# Patient Record
Sex: FEMALE | Race: WHITE | NOT HISPANIC OR LATINO | Employment: OTHER | URBAN - METROPOLITAN AREA
[De-identification: names, ages, dates, MRNs, and addresses within clinical notes are randomized per-mention and may not be internally consistent; named-entity substitution may affect disease eponyms.]

---

## 2017-06-30 ENCOUNTER — HISTORICAL (OUTPATIENT)
Dept: LAB | Facility: HOSPITAL | Age: 57
End: 2017-06-30

## 2017-06-30 LAB
ALBUMIN SERPL-MCNC: 4.6 GM/DL (ref 3.4–5)
ALBUMIN/GLOB SERPL: 1.4 RATIO (ref 1.1–2)
ALP SERPL-CCNC: 55 UNIT/L (ref 46–116)
ALT SERPL-CCNC: 39 UNIT/L (ref 12–78)
AST SERPL-CCNC: 23 UNIT/L (ref 15–37)
BILIRUB SERPL-MCNC: 0.2 MG/DL (ref 0.2–1)
BILIRUBIN DIRECT+TOT PNL SERPL-MCNC: 0.07 MG/DL (ref 0–0.2)
BILIRUBIN DIRECT+TOT PNL SERPL-MCNC: 0.13 MG/DL (ref 0–0.8)
BUN SERPL-MCNC: 9 MG/DL (ref 7–18)
CALCIUM SERPL-MCNC: 9.2 MG/DL (ref 8.5–10.1)
CHLORIDE SERPL-SCNC: 105 MMOL/L (ref 98–107)
CHOLEST SERPL-MCNC: 172 MG/DL (ref 0–200)
CHOLEST/HDLC SERPL: 3.6 {RATIO} (ref 0–4)
CO2 SERPL-SCNC: 26.9 MMOL/L (ref 21–32)
CREAT SERPL-MCNC: 0.71 MG/DL (ref 0.6–1.3)
DEPRECATED CALCIDIOL+CALCIFEROL SERPL-MC: 20.49 NG/ML (ref 30–80)
ERYTHROCYTE [DISTWIDTH] IN BLOOD BY AUTOMATED COUNT: 13 % (ref 11.5–17)
EST. AVERAGE GLUCOSE BLD GHB EST-MCNC: 111 MG/DL
FT4I SERPL CALC-MCNC: 2.94
GLOBULIN SER-MCNC: 3.3 GM/DL (ref 2.4–3.5)
GLUCOSE SERPL-MCNC: 101 MG/DL (ref 74–106)
H PYLORI AB SER IA-ACNC: NEGATIVE
HBA1C MFR BLD: 5.5 % (ref 4.5–6.2)
HCT VFR BLD AUTO: 45.3 % (ref 37–47)
HDLC SERPL-MCNC: 48 MG/DL (ref 40–60)
HGB BLD-MCNC: 14.8 GM/DL (ref 12–16)
LDLC SERPL CALC-MCNC: 111 MG/DL (ref 0–129)
MCH RBC QN AUTO: 28.3 PG (ref 27–31)
MCHC RBC AUTO-ENTMCNC: 32.7 GM/DL (ref 33–36)
MCV RBC AUTO: 86.7 FL (ref 80–94)
PLATELET # BLD AUTO: 234 X10(3)/MCL (ref 130–400)
PMV BLD AUTO: 9 FL (ref 7.4–10.4)
POTASSIUM SERPL-SCNC: 4.4 MMOL/L (ref 3.5–5.1)
PROT SERPL-MCNC: 7.9 GM/DL (ref 6.4–8.2)
RBC # BLD AUTO: 5.22 X10(6)/MCL (ref 4.2–5.4)
SODIUM SERPL-SCNC: 144 MMOL/L (ref 136–145)
T3RU NFR SERPL: 33 % (ref 31–39)
T4 SERPL-MCNC: 8.9 MCG/DL (ref 4.7–13.3)
TESTOST SERPL-MCNC: 48.1 NG/DL (ref 14–76)
TRIGL SERPL-MCNC: 65 MG/DL
TSH SERPL-ACNC: 1.7 MIU/ML (ref 0.36–3.74)
VLDLC SERPL CALC-MCNC: 13 MG/DL
WBC # SPEC AUTO: 11.8 X10(3)/MCL (ref 4.5–11.5)

## 2017-07-21 ENCOUNTER — HISTORICAL (OUTPATIENT)
Dept: LAB | Facility: HOSPITAL | Age: 57
End: 2017-07-21

## 2017-10-10 ENCOUNTER — HISTORICAL (OUTPATIENT)
Dept: RADIOLOGY | Facility: HOSPITAL | Age: 57
End: 2017-10-10

## 2018-12-18 ENCOUNTER — HISTORICAL (OUTPATIENT)
Dept: INFUSION THERAPY | Facility: HOSPITAL | Age: 58
End: 2018-12-18

## 2019-07-22 ENCOUNTER — HISTORICAL (OUTPATIENT)
Dept: INFUSION THERAPY | Facility: HOSPITAL | Age: 59
End: 2019-07-22

## 2019-11-11 ENCOUNTER — HISTORICAL (OUTPATIENT)
Dept: ADMINISTRATIVE | Facility: HOSPITAL | Age: 59
End: 2019-11-11

## 2020-03-06 ENCOUNTER — HISTORICAL (OUTPATIENT)
Dept: INFUSION THERAPY | Facility: HOSPITAL | Age: 60
End: 2020-03-06

## 2020-08-25 LAB
HUMAN PAPILLOMAVIRUS (HPV): NORMAL
PAP RECOMMENDATION EXT: NORMAL

## 2020-09-11 ENCOUNTER — HISTORICAL (OUTPATIENT)
Dept: INFUSION THERAPY | Facility: HOSPITAL | Age: 60
End: 2020-09-11

## 2020-12-02 ENCOUNTER — HISTORICAL (OUTPATIENT)
Dept: ADMINISTRATIVE | Facility: HOSPITAL | Age: 60
End: 2020-12-02

## 2020-12-17 ENCOUNTER — HISTORICAL (OUTPATIENT)
Dept: RADIOLOGY | Facility: HOSPITAL | Age: 60
End: 2020-12-17

## 2022-08-11 ENCOUNTER — OFFICE VISIT (OUTPATIENT)
Dept: URGENT CARE | Facility: CLINIC | Age: 62
End: 2022-08-11
Payer: COMMERCIAL

## 2022-08-11 VITALS
OXYGEN SATURATION: 98 % | TEMPERATURE: 99 F | WEIGHT: 137 LBS | HEART RATE: 57 BPM | SYSTOLIC BLOOD PRESSURE: 158 MMHG | RESPIRATION RATE: 18 BRPM | DIASTOLIC BLOOD PRESSURE: 83 MMHG | BODY MASS INDEX: 22.02 KG/M2 | HEIGHT: 66 IN

## 2022-08-11 DIAGNOSIS — J01.40 ACUTE PANSINUSITIS, RECURRENCE NOT SPECIFIED: Primary | ICD-10-CM

## 2022-08-11 PROCEDURE — 3008F PR BODY MASS INDEX (BMI) DOCUMENTED: ICD-10-PCS | Mod: CPTII,,, | Performed by: FAMILY MEDICINE

## 2022-08-11 PROCEDURE — 3008F BODY MASS INDEX DOCD: CPT | Mod: CPTII,,, | Performed by: FAMILY MEDICINE

## 2022-08-11 PROCEDURE — 1160F RVW MEDS BY RX/DR IN RCRD: CPT | Mod: CPTII,,, | Performed by: FAMILY MEDICINE

## 2022-08-11 PROCEDURE — 3079F PR MOST RECENT DIASTOLIC BLOOD PRESSURE 80-89 MM HG: ICD-10-PCS | Mod: CPTII,,, | Performed by: FAMILY MEDICINE

## 2022-08-11 PROCEDURE — 1160F PR REVIEW ALL MEDS BY PRESCRIBER/CLIN PHARMACIST DOCUMENTED: ICD-10-PCS | Mod: CPTII,,, | Performed by: FAMILY MEDICINE

## 2022-08-11 PROCEDURE — 3077F SYST BP >= 140 MM HG: CPT | Mod: CPTII,,, | Performed by: FAMILY MEDICINE

## 2022-08-11 PROCEDURE — 1159F MED LIST DOCD IN RCRD: CPT | Mod: CPTII,,, | Performed by: FAMILY MEDICINE

## 2022-08-11 PROCEDURE — 99203 OFFICE O/P NEW LOW 30 MIN: CPT | Mod: ,,, | Performed by: FAMILY MEDICINE

## 2022-08-11 PROCEDURE — 3077F PR MOST RECENT SYSTOLIC BLOOD PRESSURE >= 140 MM HG: ICD-10-PCS | Mod: CPTII,,, | Performed by: FAMILY MEDICINE

## 2022-08-11 PROCEDURE — 1159F PR MEDICATION LIST DOCUMENTED IN MEDICAL RECORD: ICD-10-PCS | Mod: CPTII,,, | Performed by: FAMILY MEDICINE

## 2022-08-11 PROCEDURE — 99203 PR OFFICE/OUTPT VISIT, NEW, LEVL III, 30-44 MIN: ICD-10-PCS | Mod: ,,, | Performed by: FAMILY MEDICINE

## 2022-08-11 PROCEDURE — 3079F DIAST BP 80-89 MM HG: CPT | Mod: CPTII,,, | Performed by: FAMILY MEDICINE

## 2022-08-11 RX ORDER — PREDNISONE 20 MG/1
20 TABLET ORAL DAILY
Qty: 5 TABLET | Refills: 0 | Status: SHIPPED | OUTPATIENT
Start: 2022-08-11 | End: 2022-08-16

## 2022-08-11 RX ORDER — CYCLOBENZAPRINE HCL 10 MG
10 TABLET ORAL NIGHTLY PRN
COMMUNITY
Start: 2022-07-25 | End: 2022-08-18

## 2022-08-11 RX ORDER — ALPRAZOLAM 0.25 MG/1
.125-.25 TABLET ORAL 2 TIMES DAILY
COMMUNITY
Start: 2022-03-04 | End: 2022-08-18 | Stop reason: ALTCHOICE

## 2022-08-11 RX ORDER — VENLAFAXINE HYDROCHLORIDE 75 MG/1
75 CAPSULE, EXTENDED RELEASE ORAL EVERY MORNING
COMMUNITY
Start: 2022-03-28 | End: 2022-08-18

## 2022-08-11 RX ORDER — CEFDINIR 300 MG/1
300 CAPSULE ORAL 2 TIMES DAILY
Qty: 14 CAPSULE | Refills: 0 | Status: SHIPPED | OUTPATIENT
Start: 2022-08-11 | End: 2022-08-18

## 2022-08-11 RX ORDER — RISEDRONATE SODIUM 35 MG/1
1 TABLET, DELAYED RELEASE ORAL
COMMUNITY
Start: 2022-06-30 | End: 2022-08-18 | Stop reason: SDUPTHER

## 2022-08-11 NOTE — PROGRESS NOTES
"Subjective:       Patient ID: Kat Frazier is a 61 y.o. female.    Vitals:  height is 5' 6" (1.676 m) and weight is 62.1 kg (137 lb). Her oral temperature is 98.9 °F (37.2 °C). Her blood pressure is 158/83 (abnormal) and her pulse is 57 (abnormal). Her respiration is 18 and oxygen saturation is 98%.     Chief Complaint: Nasal Congestion    Patient presents to clinic with nasal congestion x 2-3 days    [place]:  61-year-old present to clinic with concerns of worsening nasal congestion, sinus congestion, postnasal drip and coughing since 6 days.  Gradual in onset and worsening noticing change in mucus color and thickness.  No shortness of breath or wheezing.  No concerns of positive exposure to infections.  Defers COVID testing today.  States home COVID-19 test has been negative    ROS :  Constitutional_No  Fever or Body aches, Chills  HENT_Sore throat, Difficulty swallowing, postnasal drainage  Respiratory_no wheezing, no shortness of breath  Cardiovascular_no chest pain  Gastrointestinal_ No nausea,No vomiting, No diarrhea, No abdominal pain  Musculoskeletal_no joint pain, no joint swelling  Integumentary_no skin rash     Objective:      Physical Exam    General : Alert and Oriented, No apparent distress, afebrile, sounds stuffy congested and nasal twang to voice  Neck - supple  HENT : Oropharynx no redness or swelling. Tonsils 2+ bilateral, no exudate. TMs intact mild fluid no redness.   Respiratory : Bilateral equal breath sounds, nonlabored respirations  Cardiovascular : Rate, rhythm regular, normal volume pulse, no murmur  Integumentary : Warm, Dry and no rash    Assessment:       1. Acute pansinusitis, recurrence not specified          Plan:     discussed the physical findings, clinical diagnosis, condition and course.  Cool mist vaporizer to keep there was moist and help draining sinuses.  Medications as directed.  Antihistamine of choice over-the-counter for congestion.  Continue Mucinex for cough and " cold.  Alternate Tylenol ibuprofen for pain and discomfort.  Call or return to clinic for any questions    Acute pansinusitis, recurrence not specified  -     cefdinir (OMNICEF) 300 MG capsule; Take 1 capsule (300 mg total) by mouth 2 (two) times daily. for 7 days  Dispense: 14 capsule; Refill: 0  -     predniSONE (DELTASONE) 20 MG tablet; Take 1 tablet (20 mg total) by mouth once daily. for 5 days  Dispense: 5 tablet; Refill: 0

## 2022-08-11 NOTE — PATIENT INSTRUCTIONS
discussed the physical findings, clinical diagnosis, condition and course.  Cool mist vaporizer to keep there was moist and help draining sinuses.  Medications as directed.  Antihistamine of choice over-the-counter for congestion.  Continue Mucinex for cough and cold.  Alternate Tylenol ibuprofen for pain and discomfort.  Call or return to clinic for any questions

## 2022-08-18 ENCOUNTER — OFFICE VISIT (OUTPATIENT)
Dept: FAMILY MEDICINE | Facility: CLINIC | Age: 62
End: 2022-08-18
Payer: COMMERCIAL

## 2022-08-18 VITALS
BODY MASS INDEX: 20.89 KG/M2 | DIASTOLIC BLOOD PRESSURE: 85 MMHG | SYSTOLIC BLOOD PRESSURE: 129 MMHG | RESPIRATION RATE: 20 BRPM | WEIGHT: 130 LBS | HEIGHT: 66 IN | OXYGEN SATURATION: 99 % | HEART RATE: 79 BPM | TEMPERATURE: 99 F

## 2022-08-18 DIAGNOSIS — K58.0 IRRITABLE BOWEL SYNDROME WITH DIARRHEA: ICD-10-CM

## 2022-08-18 DIAGNOSIS — M54.50 CHRONIC BILATERAL LOW BACK PAIN WITHOUT SCIATICA: Chronic | ICD-10-CM

## 2022-08-18 DIAGNOSIS — Z76.89 ENCOUNTER TO ESTABLISH CARE: Primary | ICD-10-CM

## 2022-08-18 DIAGNOSIS — I10 PRIMARY HYPERTENSION: ICD-10-CM

## 2022-08-18 DIAGNOSIS — F33.1 MDD (MAJOR DEPRESSIVE DISORDER), RECURRENT EPISODE, MODERATE: ICD-10-CM

## 2022-08-18 DIAGNOSIS — F41.1 GENERALIZED ANXIETY DISORDER: Chronic | ICD-10-CM

## 2022-08-18 DIAGNOSIS — G89.29 CHRONIC BILATERAL LOW BACK PAIN WITHOUT SCIATICA: Chronic | ICD-10-CM

## 2022-08-18 DIAGNOSIS — M85.89 OSTEOPENIA OF MULTIPLE SITES: ICD-10-CM

## 2022-08-18 DIAGNOSIS — K21.9 GASTROESOPHAGEAL REFLUX DISEASE WITHOUT ESOPHAGITIS: ICD-10-CM

## 2022-08-18 PROBLEM — F90.9 ATTENTION DEFICIT HYPERACTIVITY DISORDER (ADHD): Status: ACTIVE | Noted: 2022-08-18

## 2022-08-18 PROCEDURE — 1159F MED LIST DOCD IN RCRD: CPT | Mod: CPTII,,, | Performed by: FAMILY MEDICINE

## 2022-08-18 PROCEDURE — 1160F RVW MEDS BY RX/DR IN RCRD: CPT | Mod: CPTII,,, | Performed by: FAMILY MEDICINE

## 2022-08-18 PROCEDURE — 1159F PR MEDICATION LIST DOCUMENTED IN MEDICAL RECORD: ICD-10-PCS | Mod: CPTII,,, | Performed by: FAMILY MEDICINE

## 2022-08-18 PROCEDURE — 99204 OFFICE O/P NEW MOD 45 MIN: CPT | Mod: ,,, | Performed by: FAMILY MEDICINE

## 2022-08-18 PROCEDURE — 3008F PR BODY MASS INDEX (BMI) DOCUMENTED: ICD-10-PCS | Mod: CPTII,,, | Performed by: FAMILY MEDICINE

## 2022-08-18 PROCEDURE — 1160F PR REVIEW ALL MEDS BY PRESCRIBER/CLIN PHARMACIST DOCUMENTED: ICD-10-PCS | Mod: CPTII,,, | Performed by: FAMILY MEDICINE

## 2022-08-18 PROCEDURE — 3008F BODY MASS INDEX DOCD: CPT | Mod: CPTII,,, | Performed by: FAMILY MEDICINE

## 2022-08-18 PROCEDURE — 99204 PR OFFICE/OUTPT VISIT, NEW, LEVL IV, 45-59 MIN: ICD-10-PCS | Mod: ,,, | Performed by: FAMILY MEDICINE

## 2022-08-18 PROCEDURE — 3079F DIAST BP 80-89 MM HG: CPT | Mod: CPTII,,, | Performed by: FAMILY MEDICINE

## 2022-08-18 PROCEDURE — 3079F PR MOST RECENT DIASTOLIC BLOOD PRESSURE 80-89 MM HG: ICD-10-PCS | Mod: CPTII,,, | Performed by: FAMILY MEDICINE

## 2022-08-18 PROCEDURE — 3074F SYST BP LT 130 MM HG: CPT | Mod: CPTII,,, | Performed by: FAMILY MEDICINE

## 2022-08-18 PROCEDURE — 3074F PR MOST RECENT SYSTOLIC BLOOD PRESSURE < 130 MM HG: ICD-10-PCS | Mod: CPTII,,, | Performed by: FAMILY MEDICINE

## 2022-08-18 RX ORDER — METOPROLOL SUCCINATE 50 MG/1
50 TABLET, EXTENDED RELEASE ORAL DAILY
COMMUNITY
Start: 2022-06-26 | End: 2022-10-04 | Stop reason: SDUPTHER

## 2022-08-18 RX ORDER — DESVENLAFAXINE SUCCINATE 50 MG/1
50 TABLET, EXTENDED RELEASE ORAL DAILY
COMMUNITY
End: 2022-08-18

## 2022-08-18 RX ORDER — PANTOPRAZOLE SODIUM 40 MG/1
40 TABLET, DELAYED RELEASE ORAL DAILY
Qty: 30 TABLET | Refills: 2 | Status: SHIPPED | OUTPATIENT
Start: 2022-08-18 | End: 2023-06-15

## 2022-08-18 RX ORDER — NORETHINDRONE ACETATE AND ETHINYL ESTRADIOL 1; 5 MG/1; UG/1
1 TABLET ORAL DAILY
COMMUNITY

## 2022-08-18 RX ORDER — RISEDRONATE SODIUM 35 MG/1
1 TABLET, DELAYED RELEASE ORAL
Qty: 12 TABLET | Refills: 1 | Status: SHIPPED | OUTPATIENT
Start: 2022-08-18 | End: 2022-08-25 | Stop reason: ALTCHOICE

## 2022-08-18 RX ORDER — PAROXETINE 10 MG/1
10 TABLET, FILM COATED ORAL EVERY MORNING
Qty: 30 TABLET | Refills: 2 | Status: SHIPPED | OUTPATIENT
Start: 2022-08-18 | End: 2022-12-14 | Stop reason: SDUPTHER

## 2022-08-18 RX ORDER — DIAZEPAM 5 MG/1
5 TABLET ORAL EVERY 6 HOURS PRN
Qty: 60 TABLET | Refills: 2 | Status: SHIPPED | OUTPATIENT
Start: 2022-08-18 | End: 2022-12-14 | Stop reason: SDUPTHER

## 2022-08-18 NOTE — PROGRESS NOTES
"Subjective:      Patient ID: Kat Frazier is a 61 y.o. female.    Chief Complaint: Establish Care (Pt would like to discuss some anxiety and BP concerns. )    Patient here to establish care and with acute concerns about...    Depression due to recent separation and divorce - Has taken effexor and Pristiq in the past with ill effects, and also Wellbutrin, Valium and Xanax occasionally in the past as well.    Describes abd pains and IBS-diarrhea symptoms, depression, "breakdowns," anorexia.        Problem List Items Addressed This Visit     Generalized anxiety disorder (Chronic)    Chronic bilateral low back pain without sciatica (Chronic)      Other Visit Diagnoses     Encounter to establish care    -  Primary    MDD (major depressive disorder), recurrent episode, moderate        Primary hypertension        Osteopenia of multiple sites        Relevant Medications    risedronate 35 mg TbEC          The patient's Health Maintenance was reviewed and the following appears to be due:   Health Maintenance Due   Topic Date Due    Hepatitis C Screening  Never done    Cervical Cancer Screening  Never done    HIV Screening  Never done    TETANUS VACCINE  Never done    Colorectal Cancer Screening  Never done    Shingles Vaccine (1 of 2) Never done    COVID-19 Vaccine (3 - Booster for Pfizer series) 09/10/2021    Mammogram  12/17/2021    Lipid Panel  06/30/2022       Past Medical History:  Past Medical History:   Diagnosis Date    ADHD (attention deficit hyperactivity disorder)     Anxiety     Hypertension     Osteopenia      Past Surgical History:   Procedure Laterality Date    AUGMENTATION OF BREAST       Review of patient's allergies indicates:   Allergen Reactions    Sulfa (sulfonamide antibiotics) Itching     Current Outpatient Medications on File Prior to Visit   Medication Sig Dispense Refill    metoprolol succinate (TOPROL-XL) 50 MG 24 hr tablet Take 50 mg by mouth once daily.      " "norethindrone-ethinyl estradiol (FEMHRT 1/5) 1-5 mg-mcg Tab Take 1 tablet by mouth once daily.      [DISCONTINUED] desvenlafaxine succinate (PRISTIQ) 50 MG Tb24 Take 50 mg by mouth once daily.      [DISCONTINUED] risedronate 35 mg TbEC Take 1 tablet by mouth every 7 days.      ALPRAZolam (XANAX) 0.25 MG tablet Take 0.125-0.25 mg by mouth 2 (two) times daily.      [DISCONTINUED] cefdinir (OMNICEF) 300 MG capsule Take 1 capsule (300 mg total) by mouth 2 (two) times daily. for 7 days (Patient not taking: Reported on 8/18/2022) 14 capsule 0    [DISCONTINUED] cyclobenzaprine (FLEXERIL) 10 MG tablet Take 10 mg by mouth nightly as needed.      [DISCONTINUED] venlafaxine (EFFEXOR-XR) 75 MG 24 hr capsule Take 75 mg by mouth every morning.       No current facility-administered medications on file prior to visit.     Social History     Socioeconomic History    Marital status:     Number of children: 5   Occupational History    Occupation: Unemployed    Tobacco Use    Smoking status: Never Smoker    Smokeless tobacco: Never Used   Substance and Sexual Activity    Alcohol use: Yes     Comment: Occasionally     Drug use: Never    Sexual activity: Not Currently     Family History   Problem Relation Age of Onset    Hypertension Mother     Heart disease Father     Stroke Father     Heart attack Father        Review of Systems   All other systems reviewed and are negative.      Objective:   /85 (BP Location: Right arm, Patient Position: Sitting, BP Method: Large (Automatic))   Pulse 79   Temp 99 °F (37.2 °C) (Temporal)   Resp 20   Ht 5' 6" (1.676 m)   Wt 59 kg (130 lb)   LMP  (LMP Unknown)   SpO2 99%   BMI 20.98 kg/m²     Physical Exam  Vitals and nursing note reviewed.   Constitutional:       Appearance: Normal appearance. She is normal weight.   HENT:      Head: Normocephalic and atraumatic.      Right Ear: Tympanic membrane, ear canal and external ear normal.      Left Ear: Tympanic " membrane, ear canal and external ear normal.      Nose: Nose normal.      Mouth/Throat:      Mouth: Mucous membranes are moist.      Pharynx: Oropharynx is clear.   Eyes:      Extraocular Movements: Extraocular movements intact.      Conjunctiva/sclera: Conjunctivae normal.      Pupils: Pupils are equal, round, and reactive to light.   Cardiovascular:      Rate and Rhythm: Normal rate and regular rhythm.      Pulses: Normal pulses.      Heart sounds: Normal heart sounds.   Pulmonary:      Effort: Pulmonary effort is normal.      Breath sounds: Normal breath sounds.   Abdominal:      General: Abdomen is flat. Bowel sounds are normal.      Palpations: Abdomen is soft.   Musculoskeletal:         General: Normal range of motion.      Cervical back: Normal range of motion and neck supple.   Skin:     General: Skin is warm and dry.      Capillary Refill: Capillary refill takes less than 2 seconds.   Neurological:      General: No focal deficit present.      Mental Status: She is alert and oriented to person, place, and time. Mental status is at baseline.   Psychiatric:         Attention and Perception: Attention and perception normal.         Mood and Affect: Mood is anxious and depressed. Affect is blunt and flat.         Speech: Speech normal.         Behavior: Behavior normal. Behavior is cooperative.         Thought Content: Thought content normal.         Judgment: Judgment normal.         No visits with results within 6 Month(s) from this visit.   Latest known visit with results is:   Historical on 06/30/2017   Component Date Value Ref Range Status    Albumin/Globulin Ratio 06/30/2017 1.4  1.1 - 2.0 ratio Final    Alanine Aminotransferase 06/30/2017 39  12 - 78 unit/L Final    Albumin Level 06/30/2017 4.60  3.40 - 5.00 gm/dL Final    Alkaline Phosphatase 06/30/2017 55  46 - 116 unit/L Final    Aspartate Aminotransferase 06/30/2017 23  15 - 37 unit/L Final    Blood Urea Nitrogen 06/30/2017 9.0  7.0 - 18.0 mg/dL  Final    Bilirubin Direct 06/30/2017 0.07  0.00 - 0.20 mg/dL Final    Bilirubin Total 06/30/2017 0.2  0.2 - 1.0 mg/dL Final    Bilirubin Indirect 06/30/2017 0.13  0.00 - 0.80 mg/dL Final    Chloride 06/30/2017 105  98 - 107 mmol/L Final    Carbon Dioxide 06/30/2017 26.9  21.0 - 32.0 mmol/L Final    Calcium Level Total 06/30/2017 9.2  8.5 - 10.1 mg/dL Final    Creatinine 06/30/2017 0.71  0.60 - 1.30 mg/dL Final    Glucose Level 06/30/2017 101  74 - 106 mg/dL Final    Globulin 06/30/2017 3.30  2.40 - 3.50 gm/dL Final    Sodium Level 06/30/2017 144  136 - 145 mmol/L Final    Potassium Level 06/30/2017 4.4  3.5 - 5.1 mmol/L Final    Protein Total 06/30/2017 7.9  6.4 - 8.2 gm/dL Final    Cholesterol Total 06/30/2017 172  0 - 200 mg/dL Final    Cholesterol/HDL Ratio 06/30/2017 3.6  0.0 - 4.0 Final    HDL Cholesterol 06/30/2017 48  40 - 60 mg/dL Final    LDL Cholesterol 06/30/2017 111  0 - 129 mg/dL Final    Triglyceride 06/30/2017 65  <<=200 mg/dL Final    Very Low Density Lipoprotein 06/30/2017 13  mg/dL Final    Estimated Average Glucose 06/30/2017 111  mg/dL Final    Hemoglobin A1c 06/30/2017 5.5  4.5 - 6.2 % Final    Free Thyroxine Index 06/30/2017 2.94   Final    T3 Uptake 06/30/2017 33.0  31.0 - 39.0 % Final    Thyroxine 06/30/2017 8.90  4.70 - 13.30 mcg/dL Final    Thyroid Stimulating Hormone 06/30/2017 1.700  0.360 - 3.740 mIU/mL Final    Testosterone Total 06/30/2017 48.1  14.0 - 76.0 ng/dL Final    Vit D 25 OH 06/30/2017 20.49 (A) 30.00 - 80.00 ng/mL Final    Estimated GFR- 06/30/2017 >60  mL/min/1.73 m2 Final    Estimated GFR-Non  06/30/2017 >60  mL/min/1.73 m2 Final    Hgb 06/30/2017 14.8  12.0 - 16.0 gm/dL Final    Hct 06/30/2017 45.3  37.0 - 47.0 % Final    MCV 06/30/2017 86.7  80.0 - 94.0 fL Final    MCH 06/30/2017 28.3  27.0 - 31.0 pg Final    MCHC 06/30/2017 32.7 (A) 33.0 - 36.0 gm/dL Final    MPV 06/30/2017 9.0  7.4 - 10.4 fL Final    WBC  06/30/2017 11.8 (A) 4.5 - 11.5 x10(3)/mcL Final    RBC 06/30/2017 5.22  4.20 - 5.40 x10(6)/mcL Final    RDW 06/30/2017 13.0  11.5 - 17.0 % Final    Platelet 06/30/2017 234  130 - 400 x10(3)/mcL Final       X-Ray Femur 2 View Right  Narrative: EXAMINATION:  XR FEMUR 2 VIEW RIGHT    CLINICAL HISTORY:  myositis; Other myositis, multiple sites    TECHNIQUE:  Standard views acquired    COMPARISON:  None.    FINDINGS:  Normal bone structure of the patient's age.    Negative for fracture or lytic lesion.    Normal for alignment and mineralization.    Negative for degenerative joint disease.    Normal periarticular soft tissues.  Impression: Normal study.    Electronically signed by: Joselo Joiner  Date:    06/22/2022  Time:    13:34  X-Ray Lumbar Spine 5 View  Narrative: EXAMINATION:  XR LUMBAR SPINE COMPLETE 5 VIEW    CLINICAL HISTORY:  myositis; Other myositis, multiple sites    TECHNIQUE:  AP, lateral, bilateral oblique and L5-S1 spot views.    COMPARISON:  Five views lumbar spine dated 12/02/2020.    FINDINGS:  No change.    L3-4: Positive for degenerative instability: Advanced DJD of the facet joints with anterolisthesis of 5-6 mm.    Positive for advanced degenerative disc disease at L4-5 and L5-S1: Near complete loss of all disc space volume, endplate sclerosis and anterior spurring.    Positive advanced DJD of the facets at L4-5 and L5-S1: Prominent sclerosis, spurring and joint space narrowing.    Normal remaining bone structures and disc spaces.    No evidence of fracture or lytic lesion.    Normal for alignment and mineralization.    Normal paraspinal soft tissues.  Impression: L3-4: Degenerative instability: DJD of the facets with subluxation.    L4-5 and L5-S1: Advanced DDD and DJD of the facets.    Electronically signed by: Joselo Joiner  Date:    06/22/2022  Time:    13:33  X-Ray Hip 2 or 3 views Right (with Pelvis when performed)  Narrative: EXAMINATION:  RIGHT HIP, 2 VIEWS:    CLINICAL  HISTORY:  Myositis; pain    TECHNIQUE:  Normal left hip two views    COMPARISON:  Two views of the right hip, 12/02/2020, noted to be normal.    FINDINGS:  Normal bone structure for the patient's age.    Negative for fracture, subluxation or lytic lesion.    Normal alignment and mineralization.    No significant osteoarthritic degenerative changes.    Normal left hemipelvis.    Normal periarticular soft tissues.  Impression: NORMAL STUDY.    Electronically signed by: Joselo Joiner  Date:    06/22/2022  Time:    11:13       Assessment:     1. Encounter to establish care    2. MDD (major depressive disorder), recurrent episode, moderate    3. Generalized anxiety disorder    4. Primary hypertension    5. Osteopenia of multiple sites    6. Chronic bilateral low back pain without sciatica      Plan:   I have changed Kat Frazier's risedronate. I am also having her maintain her ALPRAZolam, metoprolol succinate, and norethindrone-ethinyl estradiol.  Problem List Items Addressed This Visit     Generalized anxiety disorder (Chronic)    Chronic bilateral low back pain without sciatica (Chronic)      Other Visit Diagnoses     Encounter to establish care    -  Primary    MDD (major depressive disorder), recurrent episode, moderate        Primary hypertension        Osteopenia of multiple sites        Relevant Medications    risedronate 35 mg TbEC        Follow up for mammogram, DEXA, pap reports from Dr. Jung; colonoscopy (Dr. Frias).    Kat was seen today for establish care.    Diagnoses and all orders for this visit:    Encounter to establish care    MDD (major depressive disorder), recurrent episode, moderate  Generalized anxiety disorder   Primary reason for visit   Has previously failed SNRI x 2, and Wellbutrin   Start Paxil   Start valium (patient is familiar with this medication)   RTC 3 months to reassess   Advise seek out grief/divorce counseling.   Counseling on disease state, medications, and adjacent  effects of anxiety    Primary hypertension   Continue current prescription medications. Refills as needed   Condition/Symptoms controlled   RTC 3 months (as scheduled) or PRN    Osteopenia of multiple sites  -     risedronate 35 mg TbEC; Take 1 tablet (35 mg total) by mouth every 7 days.   Continue current prescription medications. Refills as needed   Condition/Symptoms controlled, request recent records of relevant studies from OBGYN re: monitoring/maintenace   RTC 3 months (as scheduled) or PRN    Chronic bilateral low back pain without sciatica   Seeing Ivette Lundy.       I spent a total of 45 minutes on the day of the visit.  This includes face to face time and non-face to face time preparing to see the patient (eg, review of tests), obtaining and/or reviewing separately obtained history, documenting clinical information in the electronic or other health record, independently interpreting results and communicating results to the patient/family/caregiver, or care coordinator.          Medications Ordered This Encounter   Medications    risedronate 35 mg TbEC     Sig: Take 1 tablet (35 mg total) by mouth every 7 days.     Dispense:  12 tablet     Refill:  1     [unfilled]  No orders of the defined types were placed in this encounter.      Medication List with Changes/Refills   Current Medications    ALPRAZOLAM (XANAX) 0.25 MG TABLET    Take 0.125-0.25 mg by mouth 2 (two) times daily.    METOPROLOL SUCCINATE (TOPROL-XL) 50 MG 24 HR TABLET    Take 50 mg by mouth once daily.    NORETHINDRONE-ETHINYL ESTRADIOL (FEMHRT 1/5) 1-5 MG-MCG TAB    Take 1 tablet by mouth once daily.   Changed and/or Refilled Medications    Modified Medication Previous Medication    RISEDRONATE 35 MG TBEC risedronate 35 mg TbEC       Take 1 tablet (35 mg total) by mouth every 7 days.    Take 1 tablet by mouth every 7 days.   Discontinued Medications    CEFDINIR (OMNICEF) 300 MG CAPSULE    Take 1 capsule (300 mg total) by mouth 2 (two) times  daily. for 7 days    CYCLOBENZAPRINE (FLEXERIL) 10 MG TABLET    Take 10 mg by mouth nightly as needed.    DESVENLAFAXINE SUCCINATE (PRISTIQ) 50 MG TB24    Take 50 mg by mouth once daily.    VENLAFAXINE (EFFEXOR-XR) 75 MG 24 HR CAPSULE    Take 75 mg by mouth every morning.      Medication List with Changes/Refills   Current Medications    ALPRAZOLAM (XANAX) 0.25 MG TABLET    Take 0.125-0.25 mg by mouth 2 (two) times daily.       Start Date: 3/4/2022  End Date: --    METOPROLOL SUCCINATE (TOPROL-XL) 50 MG 24 HR TABLET    Take 50 mg by mouth once daily.       Start Date: 6/26/2022 End Date: --    NORETHINDRONE-ETHINYL ESTRADIOL (FEMHRT 1/5) 1-5 MG-MCG TAB    Take 1 tablet by mouth once daily.       Start Date: --        End Date: --   Changed and/or Refilled Medications    Modified Medication Previous Medication    RISEDRONATE 35 MG TBEC risedronate 35 mg TbEC       Take 1 tablet (35 mg total) by mouth every 7 days.    Take 1 tablet by mouth every 7 days.       Start Date: 8/18/2022 End Date: 2/14/2023    Start Date: 6/30/2022 End Date: 8/18/2022   Discontinued Medications    CEFDINIR (OMNICEF) 300 MG CAPSULE    Take 1 capsule (300 mg total) by mouth 2 (two) times daily. for 7 days       Start Date: 8/11/2022 End Date: 8/18/2022    CYCLOBENZAPRINE (FLEXERIL) 10 MG TABLET    Take 10 mg by mouth nightly as needed.       Start Date: 7/25/2022 End Date: 8/18/2022    DESVENLAFAXINE SUCCINATE (PRISTIQ) 50 MG TB24    Take 50 mg by mouth once daily.       Start Date: --        End Date: 8/18/2022    VENLAFAXINE (EFFEXOR-XR) 75 MG 24 HR CAPSULE    Take 75 mg by mouth every morning.       Start Date: 3/28/2022 End Date: 8/18/2022

## 2022-08-24 ENCOUNTER — TELEPHONE (OUTPATIENT)
Dept: FAMILY MEDICINE | Facility: CLINIC | Age: 62
End: 2022-08-24
Payer: COMMERCIAL

## 2022-08-24 NOTE — TELEPHONE ENCOUNTER
----- Message from Hailee Durham sent at 8/24/2022 12:08 PM CDT -----  Regarding: Meds  .Type:  Needs Medical Advice    Who Called: Pt  Symptoms (please be specific):    How long has patient had these symptoms:    Pharmacy name and phone #:  [facility] on [address]  Would the patient rather a call back or a response via [facility]? Call back  Best Call Back Number: [phone]  Additional Information: Pt wants to know if Alendronate 70mg or 30mg could be called in, pt stated that meds that was originally called in for her bone are too expensive out of pocket, would like nurse to f/u

## 2022-08-25 RX ORDER — ALENDRONATE SODIUM 70 MG/1
70 TABLET ORAL
Qty: 4 TABLET | Refills: 11 | Status: SHIPPED | OUTPATIENT
Start: 2022-08-25 | End: 2022-11-28

## 2022-08-25 NOTE — TELEPHONE ENCOUNTER
I have signed for the following orders AND/OR meds.  Please call the patient and ask the patient to schedule the testing AND/OR inform about any medications that were sent.      No orders of the defined types were placed in this encounter.      Medications Ordered This Encounter   Medications    alendronate (FOSAMAX) 70 MG tablet     Sig: Take 1 tablet (70 mg total) by mouth every 7 days.     Dispense:  4 tablet     Refill:  11

## 2022-09-14 ENCOUNTER — HOSPITAL ENCOUNTER (OUTPATIENT)
Dept: RADIOLOGY | Facility: HOSPITAL | Age: 62
Discharge: HOME | End: 2022-09-14
Attending: STUDENT IN AN ORGANIZED HEALTH CARE EDUCATION/TRAINING PROGRAM
Payer: COMMERCIAL

## 2022-09-14 DIAGNOSIS — M85.80 OSTEOPENIA: ICD-10-CM

## 2022-09-14 DIAGNOSIS — Z12.31 ENCOUNTER FOR SCREENING MAMMOGRAM FOR BREAST CANCER: ICD-10-CM

## 2022-09-14 PROCEDURE — 77063 BREAST TOMOSYNTHESIS BI: CPT | Mod: 26,,, | Performed by: STUDENT IN AN ORGANIZED HEALTH CARE EDUCATION/TRAINING PROGRAM

## 2022-09-14 PROCEDURE — 77067 SCR MAMMO BI INCL CAD: CPT | Mod: TC

## 2022-09-14 PROCEDURE — 77080 DXA BONE DENSITY AXIAL: CPT | Mod: TC

## 2022-09-14 PROCEDURE — 77080 DEXA BONE DENSITY SPINE HIP: ICD-10-PCS | Mod: 26,,, | Performed by: RADIOLOGY

## 2022-09-14 PROCEDURE — 77067 MAMMO DIGITAL SCREENING BILAT WITH TOMO: ICD-10-PCS | Mod: 26,,, | Performed by: STUDENT IN AN ORGANIZED HEALTH CARE EDUCATION/TRAINING PROGRAM

## 2022-09-14 PROCEDURE — 77063 MAMMO DIGITAL SCREENING BILAT WITH TOMO: ICD-10-PCS | Mod: 26,,, | Performed by: STUDENT IN AN ORGANIZED HEALTH CARE EDUCATION/TRAINING PROGRAM

## 2022-09-14 PROCEDURE — 77067 SCR MAMMO BI INCL CAD: CPT | Mod: 26,,, | Performed by: STUDENT IN AN ORGANIZED HEALTH CARE EDUCATION/TRAINING PROGRAM

## 2022-09-14 PROCEDURE — 77080 DXA BONE DENSITY AXIAL: CPT | Mod: 26,,, | Performed by: RADIOLOGY

## 2022-09-21 ENCOUNTER — TELEPHONE (OUTPATIENT)
Dept: FAMILY MEDICINE | Facility: CLINIC | Age: 62
End: 2022-09-21
Payer: COMMERCIAL

## 2022-09-21 DIAGNOSIS — M81.0 OSTEOPOROSIS, UNSPECIFIED OSTEOPOROSIS TYPE, UNSPECIFIED PATHOLOGICAL FRACTURE PRESENCE: Primary | ICD-10-CM

## 2022-09-27 ENCOUNTER — PATIENT MESSAGE (OUTPATIENT)
Dept: FAMILY MEDICINE | Facility: CLINIC | Age: 62
End: 2022-09-27
Payer: COMMERCIAL

## 2022-09-29 NOTE — TELEPHONE ENCOUNTER
I spoke with the patient via phone call for 18:15 minutes regarding the confusion around her recent DEXA scans and the prescribed treatments. Her concerns were that she had previously been on Prolia, and that was stopped after her previous DEXA showed only osteopenia. But then this more recent one showed a regression to osteoporosis. I prescribed alendronate, with which she was unfamiliar, and Dr. Jung (the DEXA ordering physician who forwarded the results to me and left the prescribing decision to me) had suggested a return to Prolia.    We discussed the value and virtues of Alendronate vs prolia. She left the conversation satisfied with continuing alendronate for the time being, and will inform us of adverse effects or intolerance. Her DEXA will be repeated in 2 years as scheduled, and changes to medication discussed then based on those results.    Again, total phone time, with all time spent discussing disease, results, prescriptions, and plan = 18:15 minutes

## 2022-10-04 DIAGNOSIS — I10 HYPERTENSION, UNSPECIFIED TYPE: Primary | ICD-10-CM

## 2022-10-04 RX ORDER — METOPROLOL SUCCINATE 50 MG/1
50 TABLET, EXTENDED RELEASE ORAL DAILY
Qty: 30 TABLET | Refills: 6 | Status: SHIPPED | OUTPATIENT
Start: 2022-10-04 | End: 2022-11-28 | Stop reason: SDUPTHER

## 2022-10-21 DIAGNOSIS — M81.0 OSTEOPOROSIS, UNSPECIFIED OSTEOPOROSIS TYPE, UNSPECIFIED PATHOLOGICAL FRACTURE PRESENCE: ICD-10-CM

## 2022-10-27 ENCOUNTER — TELEPHONE (OUTPATIENT)
Dept: INFUSION THERAPY | Facility: HOSPITAL | Age: 62
End: 2022-10-27

## 2022-10-31 ENCOUNTER — INFUSION (OUTPATIENT)
Dept: INFUSION THERAPY | Facility: HOSPITAL | Age: 62
End: 2022-10-31
Attending: OBSTETRICS & GYNECOLOGY
Payer: COMMERCIAL

## 2022-10-31 VITALS
HEART RATE: 50 BPM | DIASTOLIC BLOOD PRESSURE: 79 MMHG | WEIGHT: 132 LBS | BODY MASS INDEX: 21.31 KG/M2 | TEMPERATURE: 98 F | SYSTOLIC BLOOD PRESSURE: 162 MMHG | RESPIRATION RATE: 16 BRPM

## 2022-10-31 DIAGNOSIS — M81.0 OSTEOPOROSIS, UNSPECIFIED OSTEOPOROSIS TYPE, UNSPECIFIED PATHOLOGICAL FRACTURE PRESENCE: Primary | ICD-10-CM

## 2022-10-31 PROCEDURE — 96372 THER/PROPH/DIAG INJ SC/IM: CPT

## 2022-10-31 PROCEDURE — 63600175 PHARM REV CODE 636 W HCPCS: Mod: JG | Performed by: OBSTETRICS & GYNECOLOGY

## 2022-10-31 RX ADMIN — DENOSUMAB 60 MG: 60 INJECTION SUBCUTANEOUS at 08:10

## 2022-10-31 NOTE — PLAN OF CARE
Prolia administered. Cielo well. Pt was discharged without complaints. She was advised to F/U with DR Jung prior to May appt for new orders.

## 2022-11-22 ENCOUNTER — OFFICE VISIT (OUTPATIENT)
Dept: URGENT CARE | Facility: CLINIC | Age: 62
End: 2022-11-22
Payer: COMMERCIAL

## 2022-11-22 VITALS
BODY MASS INDEX: 20.89 KG/M2 | HEIGHT: 66 IN | TEMPERATURE: 99 F | OXYGEN SATURATION: 100 % | RESPIRATION RATE: 20 BRPM | DIASTOLIC BLOOD PRESSURE: 89 MMHG | HEART RATE: 52 BPM | SYSTOLIC BLOOD PRESSURE: 156 MMHG | WEIGHT: 130 LBS

## 2022-11-22 DIAGNOSIS — R05.9 COUGH, UNSPECIFIED TYPE: ICD-10-CM

## 2022-11-22 DIAGNOSIS — J02.9 SORE THROAT: ICD-10-CM

## 2022-11-22 DIAGNOSIS — J11.1 INFLUENZA: Primary | ICD-10-CM

## 2022-11-22 LAB
CTP QC/QA: YES
MOLECULAR STREP A: NEGATIVE
POC MOLECULAR INFLUENZA A AGN: POSITIVE
POC MOLECULAR INFLUENZA B AGN: NEGATIVE
SARS-COV-2 RDRP RESP QL NAA+PROBE: NEGATIVE

## 2022-11-22 PROCEDURE — 87651 POCT STREP A MOLECULAR: ICD-10-PCS | Mod: QW,,, | Performed by: FAMILY MEDICINE

## 2022-11-22 PROCEDURE — 3079F DIAST BP 80-89 MM HG: CPT | Mod: CPTII,,, | Performed by: FAMILY MEDICINE

## 2022-11-22 PROCEDURE — 3077F SYST BP >= 140 MM HG: CPT | Mod: CPTII,,, | Performed by: FAMILY MEDICINE

## 2022-11-22 PROCEDURE — 87651 STREP A DNA AMP PROBE: CPT | Mod: QW,,, | Performed by: FAMILY MEDICINE

## 2022-11-22 PROCEDURE — 99213 OFFICE O/P EST LOW 20 MIN: CPT | Mod: ,,, | Performed by: FAMILY MEDICINE

## 2022-11-22 PROCEDURE — 87502 INFLUENZA DNA AMP PROBE: CPT | Mod: QW,,, | Performed by: FAMILY MEDICINE

## 2022-11-22 PROCEDURE — 87635 SARS-COV-2 COVID-19 AMP PRB: CPT | Mod: QW,,, | Performed by: FAMILY MEDICINE

## 2022-11-22 PROCEDURE — 87502 POCT INFLUENZA A/B MOLECULAR: ICD-10-PCS | Mod: QW,,, | Performed by: FAMILY MEDICINE

## 2022-11-22 PROCEDURE — 99213 PR OFFICE/OUTPT VISIT, EST, LEVL III, 20-29 MIN: ICD-10-PCS | Mod: ,,, | Performed by: FAMILY MEDICINE

## 2022-11-22 PROCEDURE — 1159F MED LIST DOCD IN RCRD: CPT | Mod: CPTII,,, | Performed by: FAMILY MEDICINE

## 2022-11-22 PROCEDURE — 87635: ICD-10-PCS | Mod: QW,,, | Performed by: FAMILY MEDICINE

## 2022-11-22 PROCEDURE — 3008F PR BODY MASS INDEX (BMI) DOCUMENTED: ICD-10-PCS | Mod: CPTII,,, | Performed by: FAMILY MEDICINE

## 2022-11-22 PROCEDURE — 3008F BODY MASS INDEX DOCD: CPT | Mod: CPTII,,, | Performed by: FAMILY MEDICINE

## 2022-11-22 PROCEDURE — 3077F PR MOST RECENT SYSTOLIC BLOOD PRESSURE >= 140 MM HG: ICD-10-PCS | Mod: CPTII,,, | Performed by: FAMILY MEDICINE

## 2022-11-22 PROCEDURE — 3079F PR MOST RECENT DIASTOLIC BLOOD PRESSURE 80-89 MM HG: ICD-10-PCS | Mod: CPTII,,, | Performed by: FAMILY MEDICINE

## 2022-11-22 PROCEDURE — 1159F PR MEDICATION LIST DOCUMENTED IN MEDICAL RECORD: ICD-10-PCS | Mod: CPTII,,, | Performed by: FAMILY MEDICINE

## 2022-11-22 RX ORDER — BALOXAVIR MARBOXIL 40 MG/1
40 TABLET, FILM COATED ORAL ONCE
Qty: 1 TABLET | Refills: 0 | Status: SHIPPED | OUTPATIENT
Start: 2022-11-22 | End: 2022-11-22

## 2022-11-22 RX ORDER — OSELTAMIVIR PHOSPHATE 75 MG/1
75 CAPSULE ORAL 2 TIMES DAILY
Qty: 10 CAPSULE | Refills: 0 | Status: SHIPPED | OUTPATIENT
Start: 2022-11-22 | End: 2022-11-27

## 2022-11-22 RX ORDER — TIZANIDINE 4 MG/1
TABLET ORAL
COMMUNITY
Start: 2022-10-10 | End: 2024-02-01

## 2022-11-22 RX ORDER — MELOXICAM 15 MG/1
15 TABLET ORAL DAILY
COMMUNITY
Start: 2022-10-10 | End: 2024-01-03

## 2022-11-22 NOTE — PROGRESS NOTES
"Subjective:       Patient ID: Kat Frazier is a 62 y.o. female.    Vitals:  height is 5' 6" (1.676 m) and weight is 59 kg (130 lb). Her oral temperature is 99.4 °F (37.4 °C). Her blood pressure is 156/89 (abnormal) and her pulse is 52 (abnormal). Her respiration is 20 and oxygen saturation is 100%.     Chief Complaint: Cough (Cough, sinus and chest congestion, sore throat and glands, headache, since yesterday)    62-year-old female presents to clinic complaining of a 2 day history of Cough, sinus and chest congestion, sore throat and glands,.  Denies any vomiting diarrhea or shortness of breath.  Does feel a little nauseated after coughing fits.  Denies any fever.    Cough    Constitution: Negative.   HENT: Negative.     Cardiovascular: Negative.    Eyes: Negative.    Respiratory:  Positive for cough.    Gastrointestinal: Negative.    Genitourinary: Negative.    Musculoskeletal: Negative.    Skin: Negative.    Allergic/Immunologic: Negative.    Neurological: Negative.    Hematologic/Lymphatic: Negative.      Objective:      Physical Exam   Constitutional: She is oriented to person, place, and time. She appears well-developed. She is cooperative.  Non-toxic appearance. She does not appear ill. No distress.   HENT:   Head: Normocephalic and atraumatic.   Ears:   Right Ear: Hearing and external ear normal.   Left Ear: Hearing and external ear normal.   Mouth/Throat: Oropharynx is clear and moist and mucous membranes are normal. Posterior oropharyngeal erythema: postnasal drip.   Eyes: Conjunctivae and lids are normal.   Neck: Trachea normal and phonation normal. Neck supple. No edema present. No erythema present. No neck rigidity present.   Cardiovascular: Normal rate.   Pulmonary/Chest: Effort normal and breath sounds normal. No stridor. No respiratory distress. She has no decreased breath sounds. She has no wheezes. She has no rhonchi. She has no rales.   Abdominal: Normal appearance.   Neurological: She is " "alert and oriented to person, place, and time. She exhibits normal muscle tone. Coordination normal.   Skin: Skin is warm, dry, intact, not diaphoretic and no rash.   Psychiatric: Her speech is normal and behavior is normal. Mood, judgment and thought content normal.   Nursing note and vitals reviewed.         Previous History      Review of patient's allergies indicates:   Allergen Reactions    Sulfa (sulfonamide antibiotics) Itching       Past Medical History:   Diagnosis Date    ADHD (attention deficit hyperactivity disorder)     Anxiety     Hypertension     Osteopenia      Current Outpatient Medications   Medication Instructions    alendronate (FOSAMAX) 70 mg, Oral, Every 7 days    denosumab (PROLIA SUBQ) Subcutaneous, Every 6 months    diazePAM (VALIUM) 5 mg, Oral, Every 6 hours PRN    meloxicam (MOBIC) 15 mg, Oral, Daily    metoprolol succinate (TOPROL-XL) 50 mg, Oral, Daily    norethindrone-ethinyl estradiol (FEMHRT 1/5) 1-5 mg-mcg Tab 1 tablet, Oral, Daily    oseltamivir (TAMIFLU) 75 mg, Oral, 2 times daily    pantoprazole (PROTONIX) 40 mg, Oral, Daily    paroxetine (PAXIL) 10 mg, Oral, Every morning    tiZANidine (ZANAFLEX) 4 MG tablet TAKE 1 TABLET BY MOUTH AT BEDTIME AS NEEDED FOR SPASMS    XOFLUZA 40 mg, Oral, Once     Past Surgical History:   Procedure Laterality Date    AUGMENTATION OF BREAST       Family History   Problem Relation Age of Onset    Hypertension Mother     Heart disease Father     Stroke Father     Heart attack Father        Social History     Tobacco Use    Smoking status: Never    Smokeless tobacco: Never   Substance Use Topics    Alcohol use: Yes     Comment: Occasionally     Drug use: Never        Physical Exam      Vital Signs Reviewed   BP (!) 156/89   Pulse (!) 52   Temp 99.4 °F (37.4 °C) (Oral)   Resp 20   Ht 5' 6" (1.676 m)   Wt 59 kg (130 lb)   LMP  (LMP Unknown)   SpO2 100%   BMI 20.98 kg/m²        Procedures    Procedures     Labs     Results for orders placed or " performed in visit on 06/30/17   Comprehensive Metabolic Panel   Result Value Ref Range    Albumin/Globulin Ratio 1.4 1.1 - 2.0 ratio    Alanine Aminotransferase 39 12 - 78 unit/L    Albumin Level 4.60 3.40 - 5.00 gm/dL    Alkaline Phosphatase 55 46 - 116 unit/L    Aspartate Aminotransferase 23 15 - 37 unit/L    Blood Urea Nitrogen 9.0 7.0 - 18.0 mg/dL    Bilirubin Direct 0.07 0.00 - 0.20 mg/dL    Bilirubin Total 0.2 0.2 - 1.0 mg/dL    Bilirubin Indirect 0.13 0.00 - 0.80 mg/dL    Chloride 105 98 - 107 mmol/L    Carbon Dioxide 26.9 21.0 - 32.0 mmol/L    Calcium Level Total 9.2 8.5 - 10.1 mg/dL    Creatinine 0.71 0.60 - 1.30 mg/dL    Glucose Level 101 74 - 106 mg/dL    Globulin 3.30 2.40 - 3.50 gm/dL    Sodium Level 144 136 - 145 mmol/L    Potassium Level 4.4 3.5 - 5.1 mmol/L    Protein Total 7.9 6.4 - 8.2 gm/dL   Lipid Panel   Result Value Ref Range    Cholesterol Total 172 0 - 200 mg/dL    Cholesterol/HDL Ratio 3.6 0.0 - 4.0    HDL Cholesterol 48 40 - 60 mg/dL    LDL Cholesterol 111 0 - 129 mg/dL    Triglyceride 65 <<=200 mg/dL    Very Low Density Lipoprotein 13 mg/dL   Hemoglobin A1C   Result Value Ref Range    Estimated Average Glucose 111 mg/dL    Hemoglobin A1c 5.5 4.5 - 6.2 %   Free Thyroxine Index (FTI), Serum   Result Value Ref Range    Free Thyroxine Index 2.94     T3 Uptake 33.0 31.0 - 39.0 %    Thyroxine 8.90 4.70 - 13.30 mcg/dL   TSH   Result Value Ref Range    Thyroid Stimulating Hormone 1.700 0.360 - 3.740 mIU/mL   Testosterone   Result Value Ref Range    Testosterone Total 48.1 14.0 - 76.0 ng/dL   Vitamin D   Result Value Ref Range    Vit D 25 OH 20.49 (L) 30.00 - 80.00 ng/mL   GFR, Estimated   Result Value Ref Range    Estimated GFR-African American >60 mL/min/1.73 m2    Estimated GFR-Non African American >60 mL/min/1.73 m2   CBC Without Differential   Result Value Ref Range    Hgb 14.8 12.0 - 16.0 gm/dL    Hct 45.3 37.0 - 47.0 %    MCV 86.7 80.0 - 94.0 fL    MCH 28.3 27.0 - 31.0 pg    MCHC 32.7 (L)  33.0 - 36.0 gm/dL    MPV 9.0 7.4 - 10.4 fL    WBC 11.8 (H) 4.5 - 11.5 x10(3)/mcL    RBC 5.22 4.20 - 5.40 x10(6)/mcL    RDW 13.0 11.5 - 17.0 %    Platelet 234 130 - 400 x10(3)/mcL   H pylori Ab   Result Value Ref Range    Helicobacter Pylori Antibody Negative >Negative       Assessment:       1. Influenza    2. Cough, unspecified type    3. Sore throat          Plan:       Flu A positive  Prescriptions printed  Increase fluid intake. Monitor for fever. Take tylenol/acetaminophen as needed for headache, bodyaches or fever.   Treat your symptoms like the common cold, take Delysm/dimetapp/robitussin as needed for cough, claritin, flonase, mucinex for congestion, for example.   Complications for flu include pneumonia, bronchitis, and sinusitis.   Stay home for 5 to 7 days total starting from when your symptoms began.  If your symptoms worsen, or you develop shortness of breath, worsening of cough, or fever over 102.5, seek medical attention immediately.     Influenza    Cough, unspecified type  -     POCT COVID-19 Rapid Screening  -     POCT Influenza A/B Molecular    Sore throat  -     POCT COVID-19 Rapid Screening  -     POCT Influenza A/B Molecular  -     POCT Strep A, Molecular    Other orders  -     oseltamivir (TAMIFLU) 75 MG capsule; Take 1 capsule (75 mg total) by mouth 2 (two) times daily. for 5 days  Dispense: 10 capsule; Refill: 0  -     baloxavir marboxiL (XOFLUZA) 40 mg tablet; Take 1 tablet (40 mg total) by mouth once. for 1 dose  Dispense: 1 tablet; Refill: 0

## 2022-11-22 NOTE — PATIENT INSTRUCTIONS
Flu A positive  Prescriptions printed  Increase fluid intake. Monitor for fever. Take tylenol/acetaminophen as needed for headache, bodyaches or fever.   Treat your symptoms like the common cold, take Delysm/dimetapp/robitussin as needed for cough, claritin, flonase, mucinex for congestion, for example.   Complications for flu include pneumonia, bronchitis, and sinusitis.   Stay home for 5 to 7 days total starting from when your symptoms began.  If your symptoms worsen, or you develop shortness of breath, worsening of cough, or fever over 102.5, seek medical attention immediately.

## 2022-11-28 ENCOUNTER — OFFICE VISIT (OUTPATIENT)
Dept: FAMILY MEDICINE | Facility: CLINIC | Age: 62
End: 2022-11-28
Payer: COMMERCIAL

## 2022-11-28 VITALS
BODY MASS INDEX: 21 KG/M2 | TEMPERATURE: 98 F | HEIGHT: 66 IN | WEIGHT: 130.69 LBS | RESPIRATION RATE: 19 BRPM | DIASTOLIC BLOOD PRESSURE: 64 MMHG | SYSTOLIC BLOOD PRESSURE: 122 MMHG | HEART RATE: 71 BPM | OXYGEN SATURATION: 98 %

## 2022-11-28 DIAGNOSIS — Z12.4 CERVICAL CANCER SCREENING: ICD-10-CM

## 2022-11-28 DIAGNOSIS — I10 PRIMARY HYPERTENSION: Primary | ICD-10-CM

## 2022-11-28 DIAGNOSIS — M81.0 AGE-RELATED OSTEOPOROSIS WITHOUT CURRENT PATHOLOGICAL FRACTURE: ICD-10-CM

## 2022-11-28 DIAGNOSIS — Z12.11 COLON CANCER SCREENING: ICD-10-CM

## 2022-11-28 PROBLEM — F41.9 ANXIETY: Status: ACTIVE | Noted: 2022-11-28

## 2022-11-28 PROCEDURE — 1160F RVW MEDS BY RX/DR IN RCRD: CPT | Mod: CPTII,,, | Performed by: FAMILY MEDICINE

## 2022-11-28 PROCEDURE — 99214 PR OFFICE/OUTPT VISIT, EST, LEVL IV, 30-39 MIN: ICD-10-PCS | Mod: ,,, | Performed by: FAMILY MEDICINE

## 2022-11-28 PROCEDURE — 1160F PR REVIEW ALL MEDS BY PRESCRIBER/CLIN PHARMACIST DOCUMENTED: ICD-10-PCS | Mod: CPTII,,, | Performed by: FAMILY MEDICINE

## 2022-11-28 PROCEDURE — 3074F SYST BP LT 130 MM HG: CPT | Mod: CPTII,,, | Performed by: FAMILY MEDICINE

## 2022-11-28 PROCEDURE — 3074F PR MOST RECENT SYSTOLIC BLOOD PRESSURE < 130 MM HG: ICD-10-PCS | Mod: CPTII,,, | Performed by: FAMILY MEDICINE

## 2022-11-28 PROCEDURE — 3008F BODY MASS INDEX DOCD: CPT | Mod: CPTII,,, | Performed by: FAMILY MEDICINE

## 2022-11-28 PROCEDURE — 1159F MED LIST DOCD IN RCRD: CPT | Mod: CPTII,,, | Performed by: FAMILY MEDICINE

## 2022-11-28 PROCEDURE — 3078F PR MOST RECENT DIASTOLIC BLOOD PRESSURE < 80 MM HG: ICD-10-PCS | Mod: CPTII,,, | Performed by: FAMILY MEDICINE

## 2022-11-28 PROCEDURE — 99214 OFFICE O/P EST MOD 30 MIN: CPT | Mod: ,,, | Performed by: FAMILY MEDICINE

## 2022-11-28 PROCEDURE — 3008F PR BODY MASS INDEX (BMI) DOCUMENTED: ICD-10-PCS | Mod: CPTII,,, | Performed by: FAMILY MEDICINE

## 2022-11-28 PROCEDURE — 3078F DIAST BP <80 MM HG: CPT | Mod: CPTII,,, | Performed by: FAMILY MEDICINE

## 2022-11-28 PROCEDURE — 1159F PR MEDICATION LIST DOCUMENTED IN MEDICAL RECORD: ICD-10-PCS | Mod: CPTII,,, | Performed by: FAMILY MEDICINE

## 2022-11-28 RX ORDER — METOPROLOL SUCCINATE 50 MG/1
50 TABLET, EXTENDED RELEASE ORAL DAILY
Qty: 30 TABLET | Refills: 6 | Status: SHIPPED | OUTPATIENT
Start: 2022-11-28 | End: 2022-12-14 | Stop reason: SDUPTHER

## 2022-11-28 NOTE — PROGRESS NOTES
Subjective:      Patient ID: Kat Frazier is a 62 y.o. female.    Chief Complaint: Hypertension    Problem List Items Addressed This Visit       Hypertension - Primary (Chronic)    Relevant Medications    metoprolol succinate (TOPROL-XL) 50 MG 24 hr tablet    OP (osteoporosis)    Cervical cancer screening    Colon cancer screening       The patient's Health Maintenance was reviewed and the following appears to be due:   Health Maintenance Due   Topic Date Due    Hepatitis C Screening  Never done    Cervical Cancer Screening  Never done    HIV Screening  Never done    TETANUS VACCINE  Never done    Colorectal Cancer Screening  Never done    Shingles Vaccine (1 of 2) Never done    COVID-19 Vaccine (3 - Booster for Pfizer series) 2021    Lipid Panel  2022       Past Medical History:  Past Medical History:   Diagnosis Date    ADHD (attention deficit hyperactivity disorder)     Anxiety     Hypertension     Osteopenia      Past Surgical History:   Procedure Laterality Date    AUGMENTATION OF BREAST       Review of patient's allergies indicates:   Allergen Reactions    Sulfa (sulfonamide antibiotics) Itching     Current Outpatient Medications on File Prior to Visit   Medication Sig Dispense Refill    denosumab (PROLIA SUBQ) Inject into the skin every 6 (six) months.      diazePAM (VALIUM) 5 MG tablet Take 1 tablet (5 mg total) by mouth every 6 (six) hours as needed for Anxiety. 60 tablet 2    meloxicam (MOBIC) 15 MG tablet Take 15 mg by mouth once daily.      norethindrone-ethinyl estradiol (FEMHRT ) 1-5 mg-mcg Tab Take 1 tablet by mouth once daily.      [] oseltamivir (TAMIFLU) 75 MG capsule Take 1 capsule (75 mg total) by mouth 2 (two) times daily. for 5 days 10 capsule 0    pantoprazole (PROTONIX) 40 MG tablet Take 1 tablet (40 mg total) by mouth once daily. 30 tablet 2    paroxetine (PAXIL) 10 MG tablet Take 1 tablet (10 mg total) by mouth every morning. 30 tablet 2    tiZANidine  "(ZANAFLEX) 4 MG tablet TAKE 1 TABLET BY MOUTH AT BEDTIME AS NEEDED FOR SPASMS      [DISCONTINUED] alendronate (FOSAMAX) 70 MG tablet Take 1 tablet (70 mg total) by mouth every 7 days. (Patient not taking: Reported on 11/22/2022) 4 tablet 11    [DISCONTINUED] metoprolol succinate (TOPROL-XL) 50 MG 24 hr tablet Take 1 tablet (50 mg total) by mouth once daily. 30 tablet 6     No current facility-administered medications on file prior to visit.     Social History     Socioeconomic History    Marital status:     Number of children: 5   Occupational History    Occupation: Unemployed    Tobacco Use    Smoking status: Never    Smokeless tobacco: Never   Substance and Sexual Activity    Alcohol use: Yes     Comment: Occasionally     Drug use: Never    Sexual activity: Not Currently     Family History   Problem Relation Age of Onset    Hypertension Mother     Heart disease Father     Stroke Father     Heart attack Father        Review of Systems   All other systems reviewed and are negative.    Objective:   /64 (BP Location: Right arm, Patient Position: Sitting, BP Method: Medium (Automatic))   Pulse 71   Temp 97.9 °F (36.6 °C) (Oral)   Resp 19   Ht 5' 6" (1.676 m)   Wt 59.3 kg (130 lb 11.2 oz)   LMP  (LMP Unknown)   SpO2 98%   BMI 21.10 kg/m²     Physical Exam  Vitals and nursing note reviewed.   Constitutional:       Appearance: Normal appearance. She is normal weight.   HENT:      Head: Normocephalic and atraumatic.      Right Ear: Tympanic membrane, ear canal and external ear normal.      Left Ear: Tympanic membrane, ear canal and external ear normal.      Nose: Nose normal.      Mouth/Throat:      Mouth: Mucous membranes are moist.      Pharynx: Oropharynx is clear.   Eyes:      Extraocular Movements: Extraocular movements intact.      Conjunctiva/sclera: Conjunctivae normal.      Pupils: Pupils are equal, round, and reactive to light.   Cardiovascular:      Rate and Rhythm: Normal rate and regular " rhythm.      Pulses: Normal pulses.      Heart sounds: Normal heart sounds.   Pulmonary:      Effort: Pulmonary effort is normal.      Breath sounds: Normal breath sounds.   Abdominal:      General: Abdomen is flat. Bowel sounds are normal.      Palpations: Abdomen is soft.   Musculoskeletal:         General: Normal range of motion.      Cervical back: Normal range of motion and neck supple.   Skin:     General: Skin is warm and dry.      Capillary Refill: Capillary refill takes less than 2 seconds.   Neurological:      General: No focal deficit present.      Mental Status: She is alert and oriented to person, place, and time. Mental status is at baseline.   Psychiatric:         Mood and Affect: Mood normal.         Behavior: Behavior normal.         Thought Content: Thought content normal.         Judgment: Judgment normal.       Procedures     Office Visit on 11/22/2022   Component Date Value Ref Range Status    POC Rapid COVID 11/22/2022 Negative  Negative Final     Acceptable 11/22/2022 Yes   Final    POC Molecular Influenza A Ag 11/22/2022 Positive (A)  Negative, Not Reported Final    POC Molecular Influenza B Ag 11/22/2022 Negative  Negative, Not Reported Final     Acceptable 11/22/2022 Yes   Final    Molecular Strep A, POC 11/22/2022 Negative  Negative Final     Acceptable 11/22/2022 Yes   Final       DXA Bone Density Spine And Hip  Narrative: EXAMINATION:  DEXA BONE DENSITY SPINE HIP    CLINICAL HISTORY:  Other specified disorders of bone density and structure, unspecified site    TECHNIQUE:  DXA scanning was performed over bilateral hips and the lumbar spine.  Review of the images confirms satisfactory positioning and technique.    COMPARISON:  12/17/2020    FINDINGS:  LUMBAR SPINE    The L1-L3 vertebral bone mineral density is equal to 1.196 g/cm squared with a T score of 0.1.  There has been no significant change relative to the prior study.    LEFT HIP    The  left femoral neck bone mineral density is equal to 0.721 g/cm squared with a T score of -2.3.    The left total hip bone mineral density is equal to 0.784 g/cm squared with a T score of -1.8.  There has been a 7.4% decrease relative to the prior study.    RIGHT HIP    The right femoral neck bone mineral density is equal to 0.670 g/cm squared with a T score of negative.    The right total hip bone mineral density is equal to 0.728 g/cm squared with a T score of -2.2.  There has been a 9.2% decrease relative to the prior study.    FRAX 10-YEAR PROBABILITY OF FRACTURE    There is a 12.5% risk of a major osteoporotic fracture and a 3.0% risk of hip fracture in the next 10 years (FRAX).  Impression: Osteoporosis.    Consider FDA approved medical therapies in postmenopausal women and men aged 50 years and older, based on the following:    *A hip or vertebral (clinical or morphometric) fracture  *T score less than or equal to -2.5 at the femoral neck or spine after appropriate evaluation to exclude secondary causes.  *Low bone mass -- also known as osteopenia (T score between -1.0 and -2.5 at the femoral neck or spine) and a 10 year probability of hip fracture greater than or equal to 3% or a 10 year probability of major osteoporosis-related fracture greater than or equal to 20% based on the [place]-adapted WHO algorithm.  *Clinicians judgment and/or patient preference may indicate treatment for people with 10 year fracture probabilities is above or below these levels.    Electronically signed by: Dung Cleaning  Date:    09/19/2022  Time:    14:07       Assessment:     1. Primary hypertension    2. Cervical cancer screening    3. Colon cancer screening    4. Age-related osteoporosis without current pathological fracture      Plan:   I am having Kat Frazier maintain her norethindrone-ethinyl estradiol, paroxetine, diazePAM, pantoprazole, meloxicam, tiZANidine, denosumab (PROLIA SUBQ), and metoprolol succinate.  Problem List  Items Addressed This Visit       Hypertension - Primary (Chronic)    Relevant Medications    metoprolol succinate (TOPROL-XL) 50 MG 24 hr tablet    OP (osteoporosis)    Cervical cancer screening    Colon cancer screening     Follow up in about 6 months (around 5/28/2023) for colonoscopy report from Dr. Frias, pap report from Dr. Jung.    Kat was seen today for hypertension.    Diagnoses and all orders for this visit:    Primary hypertension  -     metoprolol succinate (TOPROL-XL) 50 MG 24 hr tablet; Take 1 tablet (50 mg total) by mouth once daily.   Continue current prescription medications. Refills as needed   Condition/Symptoms controlled/stable   Surveillance labs ordered as needed, or reviewed in visit.   RTC 6 months (as scheduled) or PRN    Cervical cancer screening   Request records    Colon cancer screening   Request records    Age-related osteoporosis without current pathological fracture   Sees other provider for this   Documented for chart completeness and HCC    Medications Ordered This Encounter   Medications    metoprolol succinate (TOPROL-XL) 50 MG 24 hr tablet     Sig: Take 1 tablet (50 mg total) by mouth once daily.     Dispense:  30 tablet     Refill:  6     .     [unfilled]  No orders of the defined types were placed in this encounter.      Medication List with Changes/Refills   Current Medications    DENOSUMAB (PROLIA SUBQ)    Inject into the skin every 6 (six) months.    DIAZEPAM (VALIUM) 5 MG TABLET    Take 1 tablet (5 mg total) by mouth every 6 (six) hours as needed for Anxiety.    MELOXICAM (MOBIC) 15 MG TABLET    Take 15 mg by mouth once daily.    NORETHINDRONE-ETHINYL ESTRADIOL (FEMHRT 1/5) 1-5 MG-MCG TAB    Take 1 tablet by mouth once daily.    PANTOPRAZOLE (PROTONIX) 40 MG TABLET    Take 1 tablet (40 mg total) by mouth once daily.    PAROXETINE (PAXIL) 10 MG TABLET    Take 1 tablet (10 mg total) by mouth every morning.    TIZANIDINE (ZANAFLEX) 4 MG TABLET    TAKE 1 TABLET BY MOUTH  AT BEDTIME AS NEEDED FOR SPASMS   Changed and/or Refilled Medications    Modified Medication Previous Medication    METOPROLOL SUCCINATE (TOPROL-XL) 50 MG 24 HR TABLET metoprolol succinate (TOPROL-XL) 50 MG 24 hr tablet       Take 1 tablet (50 mg total) by mouth once daily.    Take 1 tablet (50 mg total) by mouth once daily.   Discontinued Medications    ALENDRONATE (FOSAMAX) 70 MG TABLET    Take 1 tablet (70 mg total) by mouth every 7 days.      Medication List with Changes/Refills   Current Medications    DENOSUMAB (PROLIA SUBQ)    Inject into the skin every 6 (six) months.       Start Date: --        End Date: --    DIAZEPAM (VALIUM) 5 MG TABLET    Take 1 tablet (5 mg total) by mouth every 6 (six) hours as needed for Anxiety.       Start Date: 8/18/2022 End Date: 11/16/2022    MELOXICAM (MOBIC) 15 MG TABLET    Take 15 mg by mouth once daily.       Start Date: 10/10/2022End Date: --    NORETHINDRONE-ETHINYL ESTRADIOL (FEMHRT 1/5) 1-5 MG-MCG TAB    Take 1 tablet by mouth once daily.       Start Date: --        End Date: --    PANTOPRAZOLE (PROTONIX) 40 MG TABLET    Take 1 tablet (40 mg total) by mouth once daily.       Start Date: 8/18/2022 End Date: 11/16/2022    PAROXETINE (PAXIL) 10 MG TABLET    Take 1 tablet (10 mg total) by mouth every morning.       Start Date: 8/18/2022 End Date: 11/16/2022    TIZANIDINE (ZANAFLEX) 4 MG TABLET    TAKE 1 TABLET BY MOUTH AT BEDTIME AS NEEDED FOR SPASMS       Start Date: 10/10/2022End Date: --   Changed and/or Refilled Medications    Modified Medication Previous Medication    METOPROLOL SUCCINATE (TOPROL-XL) 50 MG 24 HR TABLET metoprolol succinate (TOPROL-XL) 50 MG 24 hr tablet       Take 1 tablet (50 mg total) by mouth once daily.    Take 1 tablet (50 mg total) by mouth once daily.       Start Date: 11/28/2022End Date: --    Start Date: 10/4/2022 End Date: 11/28/2022   Discontinued Medications    ALENDRONATE (FOSAMAX) 70 MG TABLET    Take 1 tablet (70 mg total) by mouth every  7 days.       Start Date: 8/25/2022 End Date: 11/28/2022

## 2022-11-29 ENCOUNTER — DOCUMENTATION ONLY (OUTPATIENT)
Dept: ADMINISTRATIVE | Facility: HOSPITAL | Age: 62
End: 2022-11-29
Payer: COMMERCIAL

## 2022-12-14 ENCOUNTER — OFFICE VISIT (OUTPATIENT)
Dept: FAMILY MEDICINE | Facility: CLINIC | Age: 62
End: 2022-12-14
Payer: COMMERCIAL

## 2022-12-14 VITALS
SYSTOLIC BLOOD PRESSURE: 128 MMHG | WEIGHT: 129 LBS | OXYGEN SATURATION: 98 % | HEIGHT: 66 IN | BODY MASS INDEX: 20.73 KG/M2 | RESPIRATION RATE: 19 BRPM | TEMPERATURE: 98 F | DIASTOLIC BLOOD PRESSURE: 80 MMHG | HEART RATE: 53 BPM

## 2022-12-14 DIAGNOSIS — I10 PRIMARY HYPERTENSION: Primary | Chronic | ICD-10-CM

## 2022-12-14 DIAGNOSIS — F41.1 GENERALIZED ANXIETY DISORDER: Chronic | ICD-10-CM

## 2022-12-14 DIAGNOSIS — Z12.11 COLON CANCER SCREENING: ICD-10-CM

## 2022-12-14 DIAGNOSIS — Z11.59 NEED FOR HEPATITIS C SCREENING TEST: ICD-10-CM

## 2022-12-14 DIAGNOSIS — R07.9 CHEST PAIN, UNSPECIFIED TYPE: ICD-10-CM

## 2022-12-14 DIAGNOSIS — F33.1 MDD (MAJOR DEPRESSIVE DISORDER), RECURRENT EPISODE, MODERATE: ICD-10-CM

## 2022-12-14 PROCEDURE — 3079F DIAST BP 80-89 MM HG: CPT | Mod: CPTII,,, | Performed by: FAMILY MEDICINE

## 2022-12-14 PROCEDURE — 1159F MED LIST DOCD IN RCRD: CPT | Mod: CPTII,,, | Performed by: FAMILY MEDICINE

## 2022-12-14 PROCEDURE — 1159F PR MEDICATION LIST DOCUMENTED IN MEDICAL RECORD: ICD-10-PCS | Mod: CPTII,,, | Performed by: FAMILY MEDICINE

## 2022-12-14 PROCEDURE — 99214 PR OFFICE/OUTPT VISIT, EST, LEVL IV, 30-39 MIN: ICD-10-PCS | Mod: ,,, | Performed by: FAMILY MEDICINE

## 2022-12-14 PROCEDURE — 99214 OFFICE O/P EST MOD 30 MIN: CPT | Mod: ,,, | Performed by: FAMILY MEDICINE

## 2022-12-14 PROCEDURE — 3008F PR BODY MASS INDEX (BMI) DOCUMENTED: ICD-10-PCS | Mod: CPTII,,, | Performed by: FAMILY MEDICINE

## 2022-12-14 PROCEDURE — 3008F BODY MASS INDEX DOCD: CPT | Mod: CPTII,,, | Performed by: FAMILY MEDICINE

## 2022-12-14 PROCEDURE — 1160F RVW MEDS BY RX/DR IN RCRD: CPT | Mod: CPTII,,, | Performed by: FAMILY MEDICINE

## 2022-12-14 PROCEDURE — 3079F PR MOST RECENT DIASTOLIC BLOOD PRESSURE 80-89 MM HG: ICD-10-PCS | Mod: CPTII,,, | Performed by: FAMILY MEDICINE

## 2022-12-14 PROCEDURE — 3074F SYST BP LT 130 MM HG: CPT | Mod: CPTII,,, | Performed by: FAMILY MEDICINE

## 2022-12-14 PROCEDURE — 3074F PR MOST RECENT SYSTOLIC BLOOD PRESSURE < 130 MM HG: ICD-10-PCS | Mod: CPTII,,, | Performed by: FAMILY MEDICINE

## 2022-12-14 PROCEDURE — 1160F PR REVIEW ALL MEDS BY PRESCRIBER/CLIN PHARMACIST DOCUMENTED: ICD-10-PCS | Mod: CPTII,,, | Performed by: FAMILY MEDICINE

## 2022-12-14 RX ORDER — METOPROLOL SUCCINATE 50 MG/1
50 TABLET, EXTENDED RELEASE ORAL DAILY
Qty: 30 TABLET | Refills: 6 | Status: SHIPPED | OUTPATIENT
Start: 2022-12-14 | End: 2024-01-03

## 2022-12-14 RX ORDER — AMLODIPINE BESYLATE 5 MG/1
5 TABLET ORAL DAILY
Qty: 30 TABLET | Refills: 11 | Status: SHIPPED | OUTPATIENT
Start: 2022-12-14 | End: 2024-01-03

## 2022-12-14 RX ORDER — ALPRAZOLAM 0.25 MG/1
.125-.25 TABLET ORAL 2 TIMES DAILY
COMMUNITY
Start: 2022-09-06 | End: 2022-12-14

## 2022-12-14 RX ORDER — DIAZEPAM 5 MG/1
5 TABLET ORAL EVERY 6 HOURS PRN
Qty: 60 TABLET | Refills: 2 | Status: SHIPPED | OUTPATIENT
Start: 2022-12-14 | End: 2023-03-14

## 2022-12-14 RX ORDER — PAROXETINE 10 MG/1
10 TABLET, FILM COATED ORAL EVERY MORNING
Qty: 30 TABLET | Refills: 2 | Status: SHIPPED | OUTPATIENT
Start: 2022-12-14 | End: 2023-06-15

## 2022-12-14 NOTE — PROGRESS NOTES
Subjective:      Patient ID: Kat Frazier is a 62 y.o. female.    Chief Complaint: Hypertension (C/O elevated BP)    C/O elevated BP (as high as 200s/110s at home) with HA and chest tightness. Denies syncope or palpitations.     Also needs refills of vallium, and states that valium is the only thing that relieves her chest tightness      Problem List Items Addressed This Visit       Generalized anxiety disorder (Chronic)    Relevant Medications    paroxetine (PAXIL) 10 MG tablet    Colon cancer screening (Chronic)    Relevant Orders    Cologuard Screening (Multitarget Stool DNA)    Hypertension - Primary (Chronic)    Relevant Medications    metoprolol succinate (TOPROL-XL) 50 MG 24 hr tablet    amLODIPine (NORVASC) 5 MG tablet    Other Relevant Orders    Hypertension Digital Medicine (HDMP) Enrollment Order (Completed)    Hypertension Digital Medicine (HDMP): Assign Onboarding Questionnaires (Completed)    CBC Auto Differential    Comprehensive Metabolic Panel    Lipid Panel    Urinalysis    SCHEDULED EKG 12-LEAD (to Muse)     Other Visit Diagnoses       MDD (major depressive disorder), recurrent episode, moderate        Relevant Medications    diazePAM (VALIUM) 5 MG tablet    paroxetine (PAXIL) 10 MG tablet    Need for hepatitis C screening test        Relevant Orders    Hepatitis C Antibody            The patient's Health Maintenance was reviewed and the following appears to be due:   Health Maintenance Due   Topic Date Due    Hepatitis C Screening  Never done    HIV Screening  Never done    TETANUS VACCINE  Never done    Colorectal Cancer Screening  Never done    Shingles Vaccine (1 of 2) Never done    COVID-19 Vaccine (3 - Booster for Pfizer series) 06/05/2021    Lipid Panel  06/30/2022       Past Medical History:  Past Medical History:   Diagnosis Date    ADHD (attention deficit hyperactivity disorder)     Anxiety     Hypertension     Osteopenia      Past Surgical History:   Procedure Laterality Date     AUGMENTATION OF BREAST       Review of patient's allergies indicates:   Allergen Reactions    Sulfa (sulfonamide antibiotics) Itching     Current Outpatient Medications on File Prior to Visit   Medication Sig Dispense Refill    denosumab (PROLIA SUBQ) Inject into the skin every 6 (six) months.      meloxicam (MOBIC) 15 MG tablet Take 15 mg by mouth once daily.      norethindrone-ethinyl estradiol (FEMHRT 1/5) 1-5 mg-mcg Tab Take 1 tablet by mouth once daily.      tiZANidine (ZANAFLEX) 4 MG tablet TAKE 1 TABLET BY MOUTH AT BEDTIME AS NEEDED FOR SPASMS      [DISCONTINUED] ALPRAZolam (XANAX) 0.25 MG tablet Take 0.125-0.25 mg by mouth 2 (two) times daily.      [DISCONTINUED] metoprolol succinate (TOPROL-XL) 50 MG 24 hr tablet Take 1 tablet (50 mg total) by mouth once daily. 30 tablet 6    pantoprazole (PROTONIX) 40 MG tablet Take 1 tablet (40 mg total) by mouth once daily. 30 tablet 2    [DISCONTINUED] diazePAM (VALIUM) 5 MG tablet Take 1 tablet (5 mg total) by mouth every 6 (six) hours as needed for Anxiety. 60 tablet 2    [DISCONTINUED] paroxetine (PAXIL) 10 MG tablet Take 1 tablet (10 mg total) by mouth every morning. 30 tablet 2     No current facility-administered medications on file prior to visit.     Social History     Socioeconomic History    Marital status:     Number of children: 5   Occupational History    Occupation: Unemployed    Tobacco Use    Smoking status: Never    Smokeless tobacco: Never   Substance and Sexual Activity    Alcohol use: Yes     Comment: Occasionally     Drug use: Never    Sexual activity: Not Currently     Family History   Problem Relation Age of Onset    Hypertension Mother     Heart disease Father     Stroke Father     Heart attack Father        Review of Systems   Respiratory:  Positive for chest tightness.    Cardiovascular:  Positive for chest pain.   Neurological:  Positive for headaches.   All other systems reviewed and are negative.    Objective:   /80 (BP  "Location: Right arm, Patient Position: Sitting, BP Method: Medium (Automatic))   Pulse (!) 53   Temp 98 °F (36.7 °C) (Oral)   Resp 19   Ht 5' 6" (1.676 m)   Wt 58.5 kg (129 lb)   LMP  (LMP Unknown)   SpO2 98%   BMI 20.82 kg/m²     Physical Exam  Vitals and nursing note reviewed.   Constitutional:       Appearance: Normal appearance. She is normal weight.   HENT:      Head: Normocephalic and atraumatic.      Right Ear: Tympanic membrane, ear canal and external ear normal.      Left Ear: Tympanic membrane, ear canal and external ear normal.      Nose: Nose normal.      Mouth/Throat:      Mouth: Mucous membranes are moist.      Pharynx: Oropharynx is clear.   Eyes:      Extraocular Movements: Extraocular movements intact.      Conjunctiva/sclera: Conjunctivae normal.      Pupils: Pupils are equal, round, and reactive to light.   Cardiovascular:      Rate and Rhythm: Normal rate and regular rhythm.      Pulses: Normal pulses.      Heart sounds: Normal heart sounds.   Pulmonary:      Effort: Pulmonary effort is normal.      Breath sounds: Normal breath sounds.   Abdominal:      General: Abdomen is flat. Bowel sounds are normal.      Palpations: Abdomen is soft.   Musculoskeletal:         General: Normal range of motion.      Cervical back: Normal range of motion and neck supple.   Skin:     General: Skin is warm and dry.      Capillary Refill: Capillary refill takes less than 2 seconds.   Neurological:      General: No focal deficit present.      Mental Status: She is alert and oriented to person, place, and time. Mental status is at baseline.   Psychiatric:         Mood and Affect: Mood normal.         Behavior: Behavior normal.         Thought Content: Thought content normal.         Judgment: Judgment normal.       Procedures     Documentation Only on 11/29/2022   Component Date Value Ref Range Status    PAP Recommendation External 08/25/2020 Pap in 3 years   Final    HPV DNA 08/25/2020 None Detected  None " Detected Final   Office Visit on 11/22/2022   Component Date Value Ref Range Status    POC Rapid COVID 11/22/2022 Negative  Negative Final     Acceptable 11/22/2022 Yes   Final    POC Molecular Influenza A Ag 11/22/2022 Positive (A)  Negative, Not Reported Final    POC Molecular Influenza B Ag 11/22/2022 Negative  Negative, Not Reported Final     Acceptable 11/22/2022 Yes   Final    Molecular Strep A, POC 11/22/2022 Negative  Negative Final     Acceptable 11/22/2022 Yes   Final       X-Ray Lumbar Spine Ap Lateral w/Flex Ext  Narrative: EXAMINATION:  XR LUMBAR SPINE AP AND LAT WITH FLEX/EXT    CLINICAL HISTORY:  62-year-old woman with lumbar radiculopathy.    TECHNIQUE:  Erect AP and lateral neutral/flexion/extension views    COMPARISON:  Lumbar spine radiograph 06/22/2022.    FINDINGS:  There are 5 non-rib-bearing lumbar type vertebral bodies. Grade 1 degenerative anterolisthesis of L3 on L4 measures 5-6 mm on the lateral neutral view with no abnormal segmental motion on the lateral flexion and extension views.  There is severe complete disc space loss and associated degenerative endplate sclerosis with mild endplate osteophyte formation at L4-S1.  Moderate degenerative disc space narrowing which is greatest posteriorly at L3-L4 and mild posterior disc space narrowing at L2-L3.  Severe bilateral degenerative facet arthrosis and hypertrophy at L4-S1.  No osseous lesion or fracture.  The visualized bony pelvis is normal and no soft tissue abnormality is identified.  Impression: Advanced lumbar degenerative disc disease and facet arthrosis as detailed above including chronic grade 1 degenerative anterolisthesis of L3 with no abnormal segmental motion or acute pathology identified.    Electronically signed by: Micheal Veliz  Date:    12/08/2022  Time:    14:24       Assessment:     1. Primary hypertension    2. Generalized anxiety disorder    3. MDD (major depressive  disorder), recurrent episode, moderate    4. Colon cancer screening    5. Need for hepatitis C screening test      Plan:   I have discontinued Kat Frazier's ALPRAZolam. I am also having her start on amLODIPine. Additionally, I am having her maintain her norethindrone-ethinyl estradiol, pantoprazole, meloxicam, tiZANidine, denosumab (PROLIA SUBQ), diazePAM, paroxetine, and metoprolol succinate.  Problem List Items Addressed This Visit       Generalized anxiety disorder (Chronic)    Relevant Medications    paroxetine (PAXIL) 10 MG tablet    Colon cancer screening (Chronic)    Relevant Orders    Cologuard Screening (Multitarget Stool DNA)    Hypertension - Primary (Chronic)    Relevant Medications    metoprolol succinate (TOPROL-XL) 50 MG 24 hr tablet    amLODIPine (NORVASC) 5 MG tablet    Other Relevant Orders    Hypertension Digital Medicine (HDMP) Enrollment Order (Completed)    Hypertension Digital Medicine (HDMP): Assign Onboarding Questionnaires (Completed)    CBC Auto Differential    Comprehensive Metabolic Panel    Lipid Panel    Urinalysis    SCHEDULED EKG 12-LEAD (to Muse)     Other Visit Diagnoses       MDD (major depressive disorder), recurrent episode, moderate        Relevant Medications    diazePAM (VALIUM) 5 MG tablet    paroxetine (PAXIL) 10 MG tablet    Need for hepatitis C screening test        Relevant Orders    Hepatitis C Antibody          Follow up in about 3 months (around 3/14/2023), or as previously scheduled and/or as needed and/or 3 months.    Kat was seen today for hypertension.    Diagnoses and all orders for this visit:    Primary hypertension  -     Hypertension Digital Medicine (HDMP) Enrollment Order  -     Hypertension Digital Medicine ([facility]): Assign Onboarding Questionnaires  -     CBC Auto Differential; Future  -     Comprehensive Metabolic Panel; Future  -     Lipid Panel; Future  -     Urinalysis  -     SCHEDULED EKG 12-LEAD (to Muse); Future  -     metoprolol succinate  (TOPROL-XL) 50 MG 24 hr tablet; Take 1 tablet (50 mg total) by mouth once daily.  -     amLODIPine (NORVASC) 5 MG tablet; Take 1 tablet (5 mg total) by mouth once daily.  - Recheck BP prior to DC  - RTC 2 weeks for BP check  - Note medication changes  - RTC 3 months for face-to-face reassessment  - Referral to digital medicine and/or instructions for home BP management and measurement given  - DASH diet and HTN Lifestyle change handouts printed and provided      Generalized anxiety disorder  -     paroxetine (PAXIL) 10 MG tablet; Take 1 tablet (10 mg total) by mouth every morning.   Continue current prescription medications. Refills as needed   Condition/Symptoms controlled/stable   Surveillance labs ordered as needed, or reviewed in visit.   RTC 6 months (as scheduled) or PRN    MDD (major depressive disorder), recurrent episode, moderate  -     diazePAM (VALIUM) 5 MG tablet; Take 1 tablet (5 mg total) by mouth every 6 (six) hours as needed for Anxiety.  -     paroxetine (PAXIL) 10 MG tablet; Take 1 tablet (10 mg total) by mouth every morning.   Continue current prescription medications. Refills as needed   Condition/Symptoms controlled/stable   Surveillance labs ordered as needed, or reviewed in visit.   RTC 6 months (as scheduled) or PRN    Colon cancer screening  -     Cologuard Screening (Multitarget Stool DNA); Future  -     Cologuard Screening (Multitarget Stool DNA)    Need for hepatitis C screening test  -     Hepatitis C Antibody; Future    Chest pain, unspecified type  -     CK; Future  -     CK-MB; Future  -     Troponin I; Future      Medications Ordered This Encounter   Medications    amLODIPine (NORVASC) 5 MG tablet     Sig: Take 1 tablet (5 mg total) by mouth once daily.     Dispense:  30 tablet     Refill:  11     .    diazePAM (VALIUM) 5 MG tablet     Sig: Take 1 tablet (5 mg total) by mouth every 6 (six) hours as needed for Anxiety.     Dispense:  60 tablet     Refill:  2    metoprolol succinate  (TOPROL-XL) 50 MG 24 hr tablet     Sig: Take 1 tablet (50 mg total) by mouth once daily.     Dispense:  30 tablet     Refill:  6     .    paroxetine (PAXIL) 10 MG tablet     Sig: Take 1 tablet (10 mg total) by mouth every morning.     Dispense:  30 tablet     Refill:  2     [unfilled]  Orders Placed This Encounter   Procedures    Cologuard Screening (Multitarget Stool DNA)     Standing Status:   Future     Number of Occurrences:   1     Standing Expiration Date:   2/12/2024    CBC Auto Differential     Standing Status:   Future     Standing Expiration Date:   2/12/2024    Comprehensive Metabolic Panel     Standing Status:   Future     Standing Expiration Date:   2/12/2024    Lipid Panel     Standing Status:   Future     Standing Expiration Date:   2/12/2024    Hepatitis C Antibody     Standing Status:   Future     Standing Expiration Date:   2/12/2024     Order Specific Question:   Release to patient     Answer:   Immediate    Urinalysis    Hypertension Digital Medicine (HDMP) Enrollment Order     I. PURPOSE  To provide [facility] patients with innovative, specialized blood pressure monitoring and optimal dosing of antihypertensive therapy to improve health outcomes and decrease microvascular and macrovascular complications    II. GOALS  To maintain a systematic, coordinated and cost-effective process to monitor blood pressure in patients with hypertension  To attain and maintain optimal antihypertensive therapy while ensuring patient safety using the most recent evidence-based guidelines for the management of hypertension as reported by AHA/ACC in 2017.   Provide consultative services to providers, patients and caregivers regarding optimal antihypertensive therapy  To improve patient/caregiver understanding and compliance related to antihypertensive therapies by providing continuous patient and caregiver education about their prescribed medications and associated disease state  To provide education,  guidance and reinforcement regarding lifestyle modifications including weight loss, adopting and maintaining the Dietary Approaches to Stop Hypertension or DASH diet, sodium restriction, physical activity, and moderation of alcohol consumption to patients and caregivers  Allow providers increased availability of clinic time for direct patient care   To provide patient care and education within an interdisciplinary framework and enhance partnering with other members of health care team  Improve continuity of care for high blood pressure patients and improve patient engagement  Collect and utilize pharmacy related outcomes to improve quality of patient care    III. COLLABORATIVE PRACTICE AGREEMENT    A.  Under this collaborative practice agreement, an [facility] pharmacist according to and in compliance with [place] Board of Pharmacy Title 46, Part LIII, section 523 and [place] Board of Medical Examiners definition of the Collaborative Drug Therapy Management (CDTM) may initiate, implement, alter and monitor a therapeutic drug plan intended to manage antihypertensive therapy.  Services offered by the hypertension pharmacy specialist may include education on disease state and lifestyle modification, in addition to the drug therapy services listed above. Written and audio/visual educational materials and patient specific information may be provided to improve quality of care.    B. Primary Collaborating Physician:    Primary Physician: Gaurang Hager M.D., [facility], [facility]  , Cardiology  License number: [id number]  CDTM number:  [id number]  Telephone number: [phone]   E-mail address: [email or web address]  Emergency Contact Information: [phone]    Back-Up Physician:  Macario Nava M.D.  Cardiology  License number:  [id number]  CDTM number:  [id number]  Telephone number:  [phone]  E-mail address: [email or web address]  Emergency Contact Information: [phone]    C.  Pharmacists:   Each of the  following pharmacists will serve as the primary pharmacist on CDTM and any pharmacist may serve as the secondary pharmacist for another pharmacists agreement.  Each of the pharmacists listed below has a Pharm.D degree, with completion of an accredited pharmacy practice residency and as well as experience with managing patients along with a physician.  The outpatient monitoring service will be provided under the Cardiology Department at [facility] location in [place] at [facility] [place] [address]. The pharmacist will contact patients via telephone from a remote location.    Pharmacist: Margot Matos PharmD  This pharmacist has completed a pharmacy practice residency and has practiced clinical pharmacy for 7 years. She has experience in the areas of cardiology, acute care, and managed care. She started a pharmacist run hypertension clinic at [facility] during her residency and was published in The American Journal of Health-System Pharmacists.     [place] Pharmacist License Number: [id number]  CDTM number:  [id number]  Contact Number:  [phone]  Contact E-mail: [email or web address]  Emergency Contact Number:  [phone]    Pharmacist:  Allison Vázquez PharmD  This pharmacist has completed a pharmacy practice residency and has practiced clinical pharmacy for 17 years in the areas of cardiology, geriatric medicine, anticoagulation management, retail and ambulatory care.  She served as a pharmacy practice clinical preceptor and lead pharmacist in anticoagulation clinic for 10 years.  [place] Pharmacist License # [id number]  CDTM number:  [id number]  Contact Number:  [phone]  Contact E-mail:  [email or web address]   Emergency Contact Number:  [phone]    Pharmacist: Wendi Roque PharmD, BCACP, CDE  This pharmacist has completed a pharmacy practice residency with emphasis in ambulatory care and has practiced clinical pharmacy for 8  years in the areas of dyslipidemia, hypertension, diabetes, and anticoagulation. She is also a Certified Diabetes Educator.      [place] Pharmacist License # [id number]  CDTM number: [id number]  Contact number: [phone]  Contact E-mail:  [email or web address]  Emergency Contact Number: [phone]    Pharmacist: Radha Whiting PharmD  This pharmacist started practicing at [facility] in 2011 following her one year residency at [facility]. She serves as an Adjunct Clinical  in the College of Pharmacy at [employer]. She served as the lead pharmacist for the Coumadin Clinic team for 4 years.   [place] Pharmacist License: [id number]  CDTM number: [id number]  Contact number: [phone]  Contact E-mail: [email or web address]   Emergency Contact Number: [phone]    Pharmacist:  Michelle Juarez PharmD  This pharmacist has completed a pharmacy practice residency (PGY-1) and has practiced clinical pharmacy for 16  years in the areas of heart and abdominal transplant, retail and ambulatory care.  She was directly involved in the care of congestive heart failure, left ventricular assist device and heart transplant patients from [id number].  She is currently practicing as a digital medicine clinical specialist in heart failure.    [place] Pharmacist License # [id number]  CDTM number:  [id number]  Contact Number:  [phone]  Contact E-mail:  [email or web address]   Emergency Contact Number:  [phone]    Pharmacist: Radha Briones PharmD  This pharmacist obtained her Pharm.D. from [employer], and has completed an Ambulatory Care Pharmacy Practice residency at [facility]. She has practiced clinical pharmacy for 9  years and has experience in the areas of Hypertension and Diabetes.      [place] Pharmacist License: [id number]  CDTM Number: [id number]  Telephone number: [phone]  E-mail:  [email or web address]  Emergency Contact Number: [phone]      Pharmacist: Alina Stone PharmD  This pharmacist has completed a pharmacy practice residency with an emphasis in ambulatory care and has practiced clinical pharmacy for one year. She has experience in the areas of diabetes, hypertension and dyslipidemia.   [place] Pharmacist License: [id number]  CDTM Number: [id number]  Contact Number: [phone]  Contact Email: [email or web address]   Emergency Contact: [phone]    Pharmacist: Asia Menon PharmD, BCPS  This pharmacist has completed a pharmacy practice residency with an emphasis in ambulatory care and is a Board Certified Pharmacotherapy Specialist (BCPS). She has practiced clinical pharmacy for  years in areas of ambulatory care, internal medicine, cardiology and specialty pharmacy.    [place] Pharmacist License Number: [id number]  CDTM number:  [id number]  Contact Number:  [phone]  Contact E-mail:  [email or web address]   Emergency Contact Number:  [phone] d.  Eligible Patients  Patients whose antihypertensive therapy is managed under this agreement must have a diagnosis of hypertension as documented in the patient record, and have established care with a provider within [facility]. All aspects of the patients hypertension medication management will be followed in collaboration with the physician treating the patients hypertension.  The patient will be seen by his or her physician per their discretion or by the recommendation by the hypertension pharmacist.  The patients case may be reviewed with clinic medical personnel as needed, but will be reported at least every 30 days to the collaborating physician regarding the patients drug therapy management. All decisions made by the pharmacist will be recorded in [facility] and are readily available for physician review. All issues outside of the scope of antihypertensive therapy shall be  reported to the collaborating physician.     E.  Medications in CDTM  This collaborative practice proposal involves the management of patients who are receiving antihypertensive therapy, specifically, thiazide-type diuretics, angiotensin-converting enzyme inhibtors (ACE-I), angiotensin receptor blockers (ARB), calcium channel blockers (CCB), beta-blockers, loop diuretics, potassium-sparing diuretics, potassium supplementation, aldosterone antagonists, direct renin inhibitors, alpha-1 receptor blockers, central alpha-2 agonists, direct arterial vasodilators and peripheral adrenergic antoagonists, or those patients in which hypertension is controlled by lifestyle modifcation alone.      F.  Clinical Procedure  All patients will be notified that a hypertension CDTM exists.  A signed physician order will be required for each patient to be enrolled into the hypertension CDTM.  Informed consent and an electronic signature will be obtained from each patient and documented in the patients record.  This patient signature will be valid for 1 year, requiring a new physician order and patient consent yearly.  The patient must also be enrolled in the [employer] communication program ([facility]) to be elegible for the monitoring program.  The following management plan will be utilized for CDTM:    Patients must submit blood pressures at a minimum of once weekly from the patient- purchased wireless blood pressure cuff. Patients who fail to comply with this requirement are subject to removal from [facility].   Evaluate the change in blood pressure, if any, from previous measurements performed on the same cuff and arm.  Determine and document contributing factors for blood pressure change.    Review initial drug therapy made by clinic physician upon program enrollment with patient to ensure compliance.    Identify target blood pressure based on age and comorbidities. Therapeutic changes will be made in collaboration with PharmD and  collaborating physician based on the AHA/ACC 2017 guidelines for the treatment of hypertension.  Guide drug optimization based on patient response at 2-4 week intervals until control is achieved.  The PharmD will continue to monitor blood pressure and make adjustments to hypertension medications in order to achieve optimal blood pressure.   Order follow up labs when changing or adding ACE inhibitors and diuretics.    Refer to hypertension specialist if  blood pressure goals cannot be achieved using the noted algorithm.  Notify physician with specific problems or request clinic visit if deemed necessary.  Document antihypertensive therapy changes, interventions, and outcomes in EMR.   Provide patient/caregiver continuous education or reinforcement regarding hypertension management,  medications and lifestyle modifications.    Laboratory Tests  Pharmacists will be authorized to order and evaluate laboratory tests directly related to the disease specific drug therapy being managed. Pharmacists will also be authorized to order additional specific labs based on patient clinical presentation or description. Pharmacists may also review other lab data available in the patient record which may be necessary for the evaluation and assessment of the impact on antihypertensive therapy (i.e. drug interaction, disease interaction, etc.).     b.  Documentation   Documentation of patient encounters and lab results will be permanently placed in the [facility] EMR.  Laboratory results will automatically populate prompting review and assessment of each patient.  Laboratory results obtained from outside laboratories will be entered into EMR manually.  This documentation will include lab values, medication changes, identification and assessment of adverse events related to therapy, antihypertensive medication dose adjustments, therapeutic management plan and follow up as well as any other information given to the patient during the  telemedicine visit.    c.     1.   will be carried out through quarterly reports tracking following statistics:   a. Percent of patients requiring a change to antihypertensive medications   b. Number of emergency visits due to hypertensive urgency or emergency, hypotension or medication side effects for participating patients  Percent of patients who are controlled (BP <130/80 mmHg)  and uncontrolled (BP>130 mmHg)     2.  Patient specific quality indicators will be identified through quarterly review of the following data:   a.  Average change in blood pressure after a dose adjument by the hypertension pharmacist    3.  A random sample of patient records shall be reviewed by the primary physician quarterly to ensure adherence by the hypertension clinical specialists to the collaborative practice agreement.     4.   measures will be reported to Pharmacy & Therapeutics Committee yearly.     References:    JUDSON Yadav, et al. 2017. 2017. Guidelines for the Prevention, Detection, Evaluation and Management of High Blood Pressure in Adults.      Order Specific Question:   BP Control Goal     Answer:   Current (2017) AHA Guidelines    SCHEDULED EKG 12-LEAD (to Muse)     Standing Status:   Future     Standing Expiration Date:   12/14/2023       Medication List with Changes/Refills   New Medications    AMLODIPINE (NORVASC) 5 MG TABLET    Take 1 tablet (5 mg total) by mouth once daily.   Current Medications    DENOSUMAB (PROLIA SUBQ)    Inject into the skin every 6 (six) months.    MELOXICAM (MOBIC) 15 MG TABLET    Take 15 mg by mouth once daily.    NORETHINDRONE-ETHINYL ESTRADIOL (FEMHRT 1/5) 1-5 MG-MCG TAB    Take 1 tablet by mouth once daily.    PANTOPRAZOLE (PROTONIX) 40 MG TABLET    Take 1 tablet (40 mg total) by mouth once daily.    TIZANIDINE (ZANAFLEX) 4 MG TABLET    TAKE 1 TABLET BY MOUTH AT BEDTIME AS NEEDED FOR SPASMS   Changed and/or Refilled Medications     Modified Medication Previous Medication    DIAZEPAM (VALIUM) 5 MG TABLET diazePAM (VALIUM) 5 MG tablet       Take 1 tablet (5 mg total) by mouth every 6 (six) hours as needed for Anxiety.    Take 1 tablet (5 mg total) by mouth every 6 (six) hours as needed for Anxiety.    METOPROLOL SUCCINATE (TOPROL-XL) 50 MG 24 HR TABLET metoprolol succinate (TOPROL-XL) 50 MG 24 hr tablet       Take 1 tablet (50 mg total) by mouth once daily.    Take 1 tablet (50 mg total) by mouth once daily.    PAROXETINE (PAXIL) 10 MG TABLET paroxetine (PAXIL) 10 MG tablet       Take 1 tablet (10 mg total) by mouth every morning.    Take 1 tablet (10 mg total) by mouth every morning.   Discontinued Medications    ALPRAZOLAM (XANAX) 0.25 MG TABLET    Take 0.125-0.25 mg by mouth 2 (two) times daily.      Medication List with Changes/Refills   New Medications    AMLODIPINE (NORVASC) 5 MG TABLET    Take 1 tablet (5 mg total) by mouth once daily.       Start Date: 12/14/2022End Date: 12/14/2023   Current Medications    DENOSUMAB (PROLIA SUBQ)    Inject into the skin every 6 (six) months.       Start Date: --        End Date: --    MELOXICAM (MOBIC) 15 MG TABLET    Take 15 mg by mouth once daily.       Start Date: 10/10/2022End Date: --    NORETHINDRONE-ETHINYL ESTRADIOL (FEMHRT 1/5) 1-5 MG-MCG TAB    Take 1 tablet by mouth once daily.       Start Date: --        End Date: --    PANTOPRAZOLE (PROTONIX) 40 MG TABLET    Take 1 tablet (40 mg total) by mouth once daily.       Start Date: 8/18/2022 End Date: 11/16/2022    TIZANIDINE (ZANAFLEX) 4 MG TABLET    TAKE 1 TABLET BY MOUTH AT BEDTIME AS NEEDED FOR SPASMS       Start Date: 10/10/2022End Date: --   Changed and/or Refilled Medications    Modified Medication Previous Medication    DIAZEPAM (VALIUM) 5 MG TABLET diazePAM (VALIUM) 5 MG tablet       Take 1 tablet (5 mg total) by mouth every 6 (six) hours as needed for Anxiety.    Take 1 tablet (5 mg total) by mouth every 6 (six) hours as needed for  Anxiety.       Start Date: 12/14/2022End Date: 3/14/2023    Start Date: 8/18/2022 End Date: 12/14/2022    METOPROLOL SUCCINATE (TOPROL-XL) 50 MG 24 HR TABLET metoprolol succinate (TOPROL-XL) 50 MG 24 hr tablet       Take 1 tablet (50 mg total) by mouth once daily.    Take 1 tablet (50 mg total) by mouth once daily.       Start Date: 12/14/2022End Date: --    Start Date: 11/28/2022End Date: 12/14/2022    PAROXETINE (PAXIL) 10 MG TABLET paroxetine (PAXIL) 10 MG tablet       Take 1 tablet (10 mg total) by mouth every morning.    Take 1 tablet (10 mg total) by mouth every morning.       Start Date: 12/14/2022End Date: 3/14/2023    Start Date: 8/18/2022 End Date: 12/14/2022   Discontinued Medications    ALPRAZOLAM (XANAX) 0.25 MG TABLET    Take 0.125-0.25 mg by mouth 2 (two) times daily.       Start Date: 9/6/2022  End Date: 12/14/2022

## 2023-01-30 ENCOUNTER — PATIENT MESSAGE (OUTPATIENT)
Dept: ADMINISTRATIVE | Facility: HOSPITAL | Age: 63
End: 2023-01-30
Payer: COMMERCIAL

## 2023-03-02 ENCOUNTER — PATIENT MESSAGE (OUTPATIENT)
Dept: ADMINISTRATIVE | Facility: HOSPITAL | Age: 63
End: 2023-03-02
Payer: COMMERCIAL

## 2023-04-06 ENCOUNTER — OFFICE VISIT (OUTPATIENT)
Dept: FAMILY MEDICINE | Facility: CLINIC | Age: 63
End: 2023-04-06
Payer: COMMERCIAL

## 2023-04-06 VITALS
HEART RATE: 51 BPM | WEIGHT: 127.81 LBS | TEMPERATURE: 98 F | BODY MASS INDEX: 20.54 KG/M2 | DIASTOLIC BLOOD PRESSURE: 69 MMHG | SYSTOLIC BLOOD PRESSURE: 136 MMHG | HEIGHT: 66 IN | OXYGEN SATURATION: 99 % | RESPIRATION RATE: 19 BRPM

## 2023-04-06 DIAGNOSIS — M54.16 LUMBAR BACK PAIN WITH RADICULOPATHY AFFECTING LOWER EXTREMITY: Primary | ICD-10-CM

## 2023-04-06 PROCEDURE — 3008F PR BODY MASS INDEX (BMI) DOCUMENTED: ICD-10-PCS | Mod: CPTII,,, | Performed by: FAMILY MEDICINE

## 2023-04-06 PROCEDURE — 99214 PR OFFICE/OUTPT VISIT, EST, LEVL IV, 30-39 MIN: ICD-10-PCS | Mod: ,,, | Performed by: FAMILY MEDICINE

## 2023-04-06 PROCEDURE — 1159F PR MEDICATION LIST DOCUMENTED IN MEDICAL RECORD: ICD-10-PCS | Mod: CPTII,,, | Performed by: FAMILY MEDICINE

## 2023-04-06 PROCEDURE — 3075F PR MOST RECENT SYSTOLIC BLOOD PRESS GE 130-139MM HG: ICD-10-PCS | Mod: CPTII,,, | Performed by: FAMILY MEDICINE

## 2023-04-06 PROCEDURE — 1159F MED LIST DOCD IN RCRD: CPT | Mod: CPTII,,, | Performed by: FAMILY MEDICINE

## 2023-04-06 PROCEDURE — 3075F SYST BP GE 130 - 139MM HG: CPT | Mod: CPTII,,, | Performed by: FAMILY MEDICINE

## 2023-04-06 PROCEDURE — 3078F DIAST BP <80 MM HG: CPT | Mod: CPTII,,, | Performed by: FAMILY MEDICINE

## 2023-04-06 PROCEDURE — 1160F PR REVIEW ALL MEDS BY PRESCRIBER/CLIN PHARMACIST DOCUMENTED: ICD-10-PCS | Mod: CPTII,,, | Performed by: FAMILY MEDICINE

## 2023-04-06 PROCEDURE — 1160F RVW MEDS BY RX/DR IN RCRD: CPT | Mod: CPTII,,, | Performed by: FAMILY MEDICINE

## 2023-04-06 PROCEDURE — 99214 OFFICE O/P EST MOD 30 MIN: CPT | Mod: ,,, | Performed by: FAMILY MEDICINE

## 2023-04-06 PROCEDURE — 3078F PR MOST RECENT DIASTOLIC BLOOD PRESSURE < 80 MM HG: ICD-10-PCS | Mod: CPTII,,, | Performed by: FAMILY MEDICINE

## 2023-04-06 PROCEDURE — 3008F BODY MASS INDEX DOCD: CPT | Mod: CPTII,,, | Performed by: FAMILY MEDICINE

## 2023-04-06 RX ORDER — METHYLPREDNISOLONE 4 MG/1
TABLET ORAL
Qty: 21 EACH | Refills: 0 | Status: SHIPPED | OUTPATIENT
Start: 2023-04-06 | End: 2023-04-27

## 2023-04-06 NOTE — PROGRESS NOTES
Subjective:      Patient ID: Kat Frazier is a 62 y.o. female.    Chief Complaint: Severe Lower Back Pain    Patient with low back pain with radiculopathy. Has been dealing with this with multiple physicians over months (nearly a year). Actually has an MRI scheduled for tomorrow to assess lumbar pathology which might be causing lower extremity numbness and pain.     Here because she feels she cannot tolerate even one more night.    The patient's Health Maintenance was reviewed and the following appears to be due at this time:   Health Maintenance Due   Topic Date Due    Hepatitis C Screening  Never done    HIV Screening  Never done    TETANUS VACCINE  Never done    Colorectal Cancer Screening  Never done    Shingles Vaccine (1 of 2) Never done    COVID-19 Vaccine (3 - Booster for Pfizer series) 06/05/2021        Past Medical History:  Past Medical History:   Diagnosis Date    ADHD (attention deficit hyperactivity disorder)     Anxiety     Hypertension     Osteopenia      Past Surgical History:   Procedure Laterality Date    AUGMENTATION OF BREAST       Review of patient's allergies indicates:   Allergen Reactions    Sulfa (sulfonamide antibiotics) Itching     Current Outpatient Medications on File Prior to Visit   Medication Sig Dispense Refill    amLODIPine (NORVASC) 5 MG tablet Take 1 tablet (5 mg total) by mouth once daily. 30 tablet 11    denosumab (PROLIA SUBQ) Inject into the skin every 6 (six) months.      meloxicam (MOBIC) 15 MG tablet Take 15 mg by mouth once daily.      metoprolol succinate (TOPROL-XL) 50 MG 24 hr tablet Take 1 tablet (50 mg total) by mouth once daily. 30 tablet 6    norethindrone-ethinyl estradiol (FEMHRT 1/5) 1-5 mg-mcg Tab Take 1 tablet by mouth once daily.      diazePAM (VALIUM) 5 MG tablet Take 1 tablet (5 mg total) by mouth every 6 (six) hours as needed for Anxiety. 60 tablet 2    pantoprazole (PROTONIX) 40 MG tablet Take 1 tablet (40 mg total) by mouth once daily. 30 tablet  "2    paroxetine (PAXIL) 10 MG tablet Take 1 tablet (10 mg total) by mouth every morning. 30 tablet 2    tiZANidine (ZANAFLEX) 4 MG tablet TAKE 1 TABLET BY MOUTH AT BEDTIME AS NEEDED FOR SPASMS       No current facility-administered medications on file prior to visit.     Social History     Socioeconomic History    Marital status:     Number of children: 5   Occupational History    Occupation: Unemployed    Tobacco Use    Smoking status: Never    Smokeless tobacco: Never   Substance and Sexual Activity    Alcohol use: Yes     Comment: Occasionally     Drug use: Never    Sexual activity: Not Currently     Family History   Problem Relation Age of Onset    Hypertension Mother     Heart disease Father     Stroke Father     Heart attack Father        Review of Systems   All other systems reviewed and are negative.    Objective:   /69 (BP Location: Right arm, Patient Position: Sitting, BP Method: Medium (Automatic))   Pulse (!) 51   Temp 97.7 °F (36.5 °C) (Oral)   Resp 19   Ht 5' 6" (1.676 m)   Wt 58 kg (127 lb 12.8 oz)   LMP  (LMP Unknown)   SpO2 99%   BMI 20.63 kg/m²     Physical Exam  Vitals and nursing note reviewed.   Constitutional:       Appearance: Normal appearance. She is normal weight.   HENT:      Head: Normocephalic and atraumatic.      Right Ear: Tympanic membrane, ear canal and external ear normal.      Left Ear: Tympanic membrane, ear canal and external ear normal.      Nose: Nose normal.      Mouth/Throat:      Mouth: Mucous membranes are moist.      Pharynx: Oropharynx is clear.   Eyes:      Extraocular Movements: Extraocular movements intact.      Conjunctiva/sclera: Conjunctivae normal.      Pupils: Pupils are equal, round, and reactive to light.   Cardiovascular:      Rate and Rhythm: Normal rate and regular rhythm.      Pulses: Normal pulses.      Heart sounds: Normal heart sounds.   Pulmonary:      Effort: Pulmonary effort is normal.      Breath sounds: Normal breath sounds. "   Abdominal:      General: Abdomen is flat. Bowel sounds are normal.      Palpations: Abdomen is soft.   Musculoskeletal:      Cervical back: Normal range of motion and neck supple.      Lumbar back: Spasms present. Decreased range of motion. Positive right straight leg raise test and positive left straight leg raise test.   Skin:     General: Skin is warm and dry.      Capillary Refill: Capillary refill takes less than 2 seconds.   Neurological:      General: No focal deficit present.      Mental Status: She is alert and oriented to person, place, and time. Mental status is at baseline.   Psychiatric:         Mood and Affect: Mood normal.         Behavior: Behavior normal.         Thought Content: Thought content normal.         Judgment: Judgment normal.     Assessment:     1. Lumbar back pain with radiculopathy affecting lower extremity      Plan:   I am having Rosario Frazier start on methylPREDNISolone. I am also having her maintain her norethindrone-ethinyl estradiol, pantoprazole, meloxicam, tiZANidine, denosumab (PROLIA SUBQ), diazePAM, paroxetine, metoprolol succinate, and amLODIPine.  Problem List Items Addressed This Visit    None  Visit Diagnoses       Lumbar back pain with radiculopathy affecting lower extremity    -  Primary    Relevant Medications    methylPREDNISolone (MEDROL DOSEPACK) 4 mg tablet    Other Relevant Orders    Ambulatory referral/consult to Physical/Occupational Therapy          Follow up if symptoms worsen or fail to improve.    Kat was seen today for severe lower back pain.    Diagnoses and all orders for this visit:    Lumbar back pain with radiculopathy affecting lower extremity  -     Ambulatory referral/consult to Physical/Occupational Therapy; Future  -     methylPREDNISolone (MEDROL DOSEPACK) 4 mg tablet; use as directed  - Careful instructions to patient to NOT START MEDROL UNTIL AFTER MRI TOMORROW SO THAT IMAGING OF COMPLAINTS AT THEIR WORST CAN BE  OBTAINED.  - Stretching, referral to PT, NSAIDs OTC,   - RTC PRN      Medications Ordered This Encounter   Medications    methylPREDNISolone (MEDROL DOSEPACK) 4 mg tablet     Sig: use as directed     Dispense:  21 each     Refill:  0     [unfilled]  Orders Placed This Encounter   Procedures    Ambulatory referral/consult to Physical/Occupational Therapy     Standing Status:   Future     Standing Expiration Date:   5/6/2024     Referral Priority:   Routine     Referral Type:   Physical Medicine     Referral Reason:   Specialty Services Required     Referred to Provider:   [facility]     Requested Specialty:   Physical Therapy     Number of Visits Requested:   1       Medication List with Changes/Refills   New Medications    METHYLPREDNISOLONE (MEDROL DOSEPACK) 4 MG TABLET    use as directed   Current Medications    AMLODIPINE (NORVASC) 5 MG TABLET    Take 1 tablet (5 mg total) by mouth once daily.    DENOSUMAB (PROLIA SUBQ)    Inject into the skin every 6 (six) months.    DIAZEPAM (VALIUM) 5 MG TABLET    Take 1 tablet (5 mg total) by mouth every 6 (six) hours as needed for Anxiety.    MELOXICAM (MOBIC) 15 MG TABLET    Take 15 mg by mouth once daily.    METOPROLOL SUCCINATE (TOPROL-XL) 50 MG 24 HR TABLET    Take 1 tablet (50 mg total) by mouth once daily.    NORETHINDRONE-ETHINYL ESTRADIOL (FEMHRT 1/5) 1-5 MG-MCG TAB    Take 1 tablet by mouth once daily.    PANTOPRAZOLE (PROTONIX) 40 MG TABLET    Take 1 tablet (40 mg total) by mouth once daily.    PAROXETINE (PAXIL) 10 MG TABLET    Take 1 tablet (10 mg total) by mouth every morning.    TIZANIDINE (ZANAFLEX) 4 MG TABLET    TAKE 1 TABLET BY MOUTH AT BEDTIME AS NEEDED FOR SPASMS      Medication List with Changes/Refills   New Medications    METHYLPREDNISOLONE (MEDROL DOSEPACK) 4 MG TABLET    use as directed       Start Date: 4/6/2023  End Date: 4/27/2023   Current Medications    AMLODIPINE (NORVASC) 5 MG TABLET    Take 1 tablet (5 mg total) by  mouth once daily.       Start Date: 12/14/2022End Date: 12/14/2023    DENOSUMAB (PROLIA SUBQ)    Inject into the skin every 6 (six) months.       Start Date: --        End Date: --    DIAZEPAM (VALIUM) 5 MG TABLET    Take 1 tablet (5 mg total) by mouth every 6 (six) hours as needed for Anxiety.       Start Date: 12/14/2022End Date: 3/14/2023    MELOXICAM (MOBIC) 15 MG TABLET    Take 15 mg by mouth once daily.       Start Date: 10/10/2022End Date: --    METOPROLOL SUCCINATE (TOPROL-XL) 50 MG 24 HR TABLET    Take 1 tablet (50 mg total) by mouth once daily.       Start Date: 12/14/2022End Date: --    NORETHINDRONE-ETHINYL ESTRADIOL (FEMHRT 1/5) 1-5 MG-MCG TAB    Take 1 tablet by mouth once daily.       Start Date: --        End Date: --    PANTOPRAZOLE (PROTONIX) 40 MG TABLET    Take 1 tablet (40 mg total) by mouth once daily.       Start Date: 8/18/2022 End Date: 11/16/2022    PAROXETINE (PAXIL) 10 MG TABLET    Take 1 tablet (10 mg total) by mouth every morning.       Start Date: 12/14/2022End Date: 3/14/2023    TIZANIDINE (ZANAFLEX) 4 MG TABLET    TAKE 1 TABLET BY MOUTH AT BEDTIME AS NEEDED FOR SPASMS       Start Date: 10/10/2022End Date: --

## 2023-05-17 ENCOUNTER — INFUSION (OUTPATIENT)
Dept: INFUSION THERAPY | Facility: HOSPITAL | Age: 63
End: 2023-05-17
Attending: OBSTETRICS & GYNECOLOGY
Payer: COMMERCIAL

## 2023-05-17 VITALS
TEMPERATURE: 98 F | HEART RATE: 56 BPM | RESPIRATION RATE: 18 BRPM | DIASTOLIC BLOOD PRESSURE: 79 MMHG | SYSTOLIC BLOOD PRESSURE: 170 MMHG

## 2023-05-17 DIAGNOSIS — M81.0 OSTEOPOROSIS, UNSPECIFIED OSTEOPOROSIS TYPE, UNSPECIFIED PATHOLOGICAL FRACTURE PRESENCE: Primary | ICD-10-CM

## 2023-05-17 PROCEDURE — 63600175 PHARM REV CODE 636 W HCPCS: Mod: JZ,JG | Performed by: OBSTETRICS & GYNECOLOGY

## 2023-05-17 PROCEDURE — 96372 THER/PROPH/DIAG INJ SC/IM: CPT

## 2023-05-17 RX ADMIN — DENOSUMAB 60 MG: 60 INJECTION SUBCUTANEOUS at 03:05

## 2023-06-15 ENCOUNTER — OFFICE VISIT (OUTPATIENT)
Dept: FAMILY MEDICINE | Facility: CLINIC | Age: 63
End: 2023-06-15
Payer: COMMERCIAL

## 2023-06-15 VITALS
WEIGHT: 130.81 LBS | TEMPERATURE: 99 F | DIASTOLIC BLOOD PRESSURE: 72 MMHG | HEART RATE: 64 BPM | OXYGEN SATURATION: 99 % | HEIGHT: 66 IN | BODY MASS INDEX: 21.02 KG/M2 | RESPIRATION RATE: 16 BRPM | SYSTOLIC BLOOD PRESSURE: 116 MMHG

## 2023-06-15 DIAGNOSIS — Z78.0 POSTMENOPAUSAL: ICD-10-CM

## 2023-06-15 DIAGNOSIS — Z98.890 HISTORY OF BACK SURGERY: ICD-10-CM

## 2023-06-15 DIAGNOSIS — Z12.11 COLON CANCER SCREENING: Chronic | ICD-10-CM

## 2023-06-15 DIAGNOSIS — M81.0 AGE-RELATED OSTEOPOROSIS WITHOUT CURRENT PATHOLOGICAL FRACTURE: ICD-10-CM

## 2023-06-15 DIAGNOSIS — I10 PRIMARY HYPERTENSION: Primary | Chronic | ICD-10-CM

## 2023-06-15 DIAGNOSIS — R42 VERTIGO: ICD-10-CM

## 2023-06-15 DIAGNOSIS — F41.9 ANXIETY: ICD-10-CM

## 2023-06-15 PROCEDURE — 3008F PR BODY MASS INDEX (BMI) DOCUMENTED: ICD-10-PCS | Mod: CPTII,,, | Performed by: FAMILY MEDICINE

## 2023-06-15 PROCEDURE — 1159F MED LIST DOCD IN RCRD: CPT | Mod: CPTII,,, | Performed by: FAMILY MEDICINE

## 2023-06-15 PROCEDURE — 3074F SYST BP LT 130 MM HG: CPT | Mod: CPTII,,, | Performed by: FAMILY MEDICINE

## 2023-06-15 PROCEDURE — 99214 PR OFFICE/OUTPT VISIT, EST, LEVL IV, 30-39 MIN: ICD-10-PCS | Mod: ,,, | Performed by: FAMILY MEDICINE

## 2023-06-15 PROCEDURE — 3008F BODY MASS INDEX DOCD: CPT | Mod: CPTII,,, | Performed by: FAMILY MEDICINE

## 2023-06-15 PROCEDURE — 1160F PR REVIEW ALL MEDS BY PRESCRIBER/CLIN PHARMACIST DOCUMENTED: ICD-10-PCS | Mod: CPTII,,, | Performed by: FAMILY MEDICINE

## 2023-06-15 PROCEDURE — 3078F PR MOST RECENT DIASTOLIC BLOOD PRESSURE < 80 MM HG: ICD-10-PCS | Mod: CPTII,,, | Performed by: FAMILY MEDICINE

## 2023-06-15 PROCEDURE — 3074F PR MOST RECENT SYSTOLIC BLOOD PRESSURE < 130 MM HG: ICD-10-PCS | Mod: CPTII,,, | Performed by: FAMILY MEDICINE

## 2023-06-15 PROCEDURE — 99214 OFFICE O/P EST MOD 30 MIN: CPT | Mod: ,,, | Performed by: FAMILY MEDICINE

## 2023-06-15 PROCEDURE — 1160F RVW MEDS BY RX/DR IN RCRD: CPT | Mod: CPTII,,, | Performed by: FAMILY MEDICINE

## 2023-06-15 PROCEDURE — 3078F DIAST BP <80 MM HG: CPT | Mod: CPTII,,, | Performed by: FAMILY MEDICINE

## 2023-06-15 PROCEDURE — 1159F PR MEDICATION LIST DOCUMENTED IN MEDICAL RECORD: ICD-10-PCS | Mod: CPTII,,, | Performed by: FAMILY MEDICINE

## 2023-06-15 RX ORDER — DIAZEPAM 2 MG/1
TABLET ORAL
COMMUNITY
End: 2023-06-15

## 2023-06-15 RX ORDER — HYDROXYZINE HYDROCHLORIDE 25 MG/1
25 TABLET, FILM COATED ORAL 3 TIMES DAILY PRN
Qty: 40 TABLET | Refills: 3 | Status: SHIPPED | OUTPATIENT
Start: 2023-06-15 | End: 2024-01-03

## 2023-06-15 RX ORDER — MECLIZINE HCL 12.5 MG 12.5 MG/1
12.5 TABLET ORAL 3 TIMES DAILY PRN
Qty: 30 TABLET | Refills: 2 | Status: SHIPPED | OUTPATIENT
Start: 2023-06-15 | End: 2024-01-03

## 2023-06-15 RX ORDER — FLUTICASONE PROPIONATE 50 MCG
1 SPRAY, SUSPENSION (ML) NASAL DAILY
Qty: 16 G | Refills: 11 | Status: SHIPPED | OUTPATIENT
Start: 2023-06-15

## 2023-06-15 RX ORDER — ALENDRONATE SODIUM 70 MG/1
TABLET ORAL
COMMUNITY
End: 2023-06-15

## 2023-06-15 NOTE — ASSESSMENT & PLAN NOTE
With dr. Platt in [place] 5/2023. On muscle relaxer and nsaid from his office. Keep scheduled follow ups.

## 2023-06-15 NOTE — ASSESSMENT & PLAN NOTE
Patient is currently on valium 5mg prn.  Informed patient this is not a medication I will be able to prescribe for patient.  discussed other options.  Will try hydroxyzine. New rx sent in. Take as directed. Cautioned may cause drowsiness and therefore do not take before work or driving.     Does not want to take a daily medication. Has been on paxil and wellbutrin in the past and were not effective. Not currently seeing a counselor but is willing to.

## 2023-06-15 NOTE — PROGRESS NOTES
Subjective:        Patient ID: Kat Frazier is a 62 y.o. female.    Chief Complaint: Follow-up and Establish Care (Former [facility] patient est care/Has c/o vertigo symptoms since her back surgery 7 weeks ago. Has started taking claritin but symptoms persist )      Patient presents to clinic unaccompanied to establish care. Previously she saw dr. Bergman    She c/o vertigo symptoms since her back surgery 7 weeks ago.   Symptoms did not start right after.  Started about 1 week after surgery.  When in bed everything would spin. Would resolve on its own. Would only happen while in bed and would turn her head.  Still happeneing but not as bad.  Never happened before.  Having some nasal congestion. No sneezing.  Is taking claritin.  Helping some. Did ekg prior to surgery and was all good.  Bp is good. No falls. No new meds.     Has anxiety/depression.  Was prescribed valium 5mg prn from dr. bergman.  Was on wellbutrin and paxil in past.  Did not work.  Does not feel she wants to take a daily med.  Would like something only prn.  Has gone through a divorce recently. Kids moved out of house. Trying to sell a house.  Family members have bipolar d/o    She is postmenopausal and has as osteoporosis.  Was on prolia. Taken off.  Tried fosamax and did not work. Is back on prolia.   Prolia is prescribed by gyn.  Dr. Jung.  Is also on postmenopausal hormone replacement     She has htn-  reports compliance with her prescription meds.  Does not see cards.     She has back pain and recently had surgery with dr. Platt in [place] in 5/2023.  She is on muscle relaxer and mobic from his office. Has follow up 6/26/23.    Cologuard ordered. Mmg 9/2022. Pap 9/2022    She is allergic to sulfa drugs.     Review of Systems   Constitutional: Negative.    HENT: Negative.     Respiratory: Negative.     Cardiovascular: Negative.    Gastrointestinal: Negative.    Genitourinary: Negative.    Neurological:  Positive for dizziness.  "      Review of patient's allergies indicates:   Allergen Reactions    Sulfa (sulfonamide antibiotics) Itching      Vitals:    06/15/23 1601   BP: 116/72   BP Location: Left arm   Pulse: 64   Resp: 16   Temp: 98.7 °F (37.1 °C)   TempSrc: Temporal   SpO2: 99%   Weight: 59.3 kg (130 lb 12.8 oz)   Height: 5' 6" (1.676 m)      Social History     Socioeconomic History    Marital status:     Number of children: 5   Occupational History    Occupation: Unemployed    Tobacco Use    Smoking status: Never    Smokeless tobacco: Never   Substance and Sexual Activity    Alcohol use: Yes     Comment: Occasionally     Drug use: Never    Sexual activity: Not Currently      Family History   Problem Relation Age of Onset    Hypertension Mother     Heart disease Father     Stroke Father     Heart attack Father           Objective:     Physical Exam  Vitals and nursing note reviewed.   Constitutional:       Appearance: Normal appearance. She is normal weight.   HENT:      Head: Normocephalic and atraumatic.      Right Ear: A middle ear effusion is present. Tympanic membrane is bulging. Tympanic membrane is not erythematous.      Left Ear: A middle ear effusion is present. Tympanic membrane is bulging. Tympanic membrane is not erythematous.      Nose: Nose normal.      Mouth/Throat:      Mouth: Mucous membranes are moist.      Pharynx: Oropharynx is clear.      Comments: +PND  Eyes:      Extraocular Movements: Extraocular movements intact.   Cardiovascular:      Rate and Rhythm: Normal rate and regular rhythm.      Pulses: Normal pulses.      Heart sounds: Normal heart sounds.   Pulmonary:      Effort: Pulmonary effort is normal.      Breath sounds: Normal breath sounds.   Musculoskeletal:         General: Normal range of motion.      Cervical back: Normal range of motion.   Skin:     General: Skin is warm and dry.   Neurological:      General: No focal deficit present.      Mental Status: She is alert and oriented to person, " place, and time. Mental status is at baseline.   Psychiatric:         Mood and Affect: Mood normal.     Current Outpatient Medications on File Prior to Visit   Medication Sig Dispense Refill    amLODIPine (NORVASC) 5 MG tablet Take 1 tablet (5 mg total) by mouth once daily. 30 tablet 11    denosumab (PROLIA SUBQ) Inject into the skin every 6 (six) months.      metoprolol succinate (TOPROL-XL) 50 MG 24 hr tablet Take 1 tablet (50 mg total) by mouth once daily. 30 tablet 6    norethindrone-ethinyl estradiol (FEMHRT 1/5) 1-5 mg-mcg Tab Take 1 tablet by mouth once daily.      [DISCONTINUED] diazePAM (VALIUM) 2 MG tablet diazepam 2 mg tablet   TAKE 1 TABLET BY MOUTH EVERY 6 HOURS AS NEEDED      meloxicam (MOBIC) 15 MG tablet Take 15 mg by mouth once daily.      tiZANidine (ZANAFLEX) 4 MG tablet TAKE 1 TABLET BY MOUTH AT BEDTIME AS NEEDED FOR SPASMS      [DISCONTINUED] alendronate (FOSAMAX) 70 MG tablet alendronate 70 mg tablet   TAKE 1 TABLET BY MOUTH EVERY 7 DAYS.      [DISCONTINUED] pantoprazole (PROTONIX) 40 MG tablet Take 1 tablet (40 mg total) by mouth once daily. 30 tablet 2    [DISCONTINUED] paroxetine (PAXIL) 10 MG tablet Take 1 tablet (10 mg total) by mouth every morning. 30 tablet 2     No current facility-administered medications on file prior to visit.     Health Maintenance   Topic Date Due    Hepatitis C Screening  Never done    TETANUS VACCINE  06/15/2024 (Originally 10/7/1978)    Mammogram  09/15/2023    Lipid Panel  03/10/2027      Results for orders placed or performed in visit on 11/29/22   HPV DNA probe, amplified    Specimen: Cervix; Genital   Result Value Ref Range    HPV DNA None Detected None Detected   [facility] PAP SMEAR   Result Value Ref Range    PAP Recommendation External Pap in 3 years           Assessment & Plan:     Active Problem List with Overview Notes    Diagnosis Date Noted    Postmenopausal 06/15/2023    Vertigo 06/15/2023    History of back surgery 06/15/2023    Anxiety 11/28/2022     Cervical cancer screening 11/28/2022    Colon cancer screening 11/28/2022    Hypertension     OP (osteoporosis) 10/21/2022    Generalized anxiety disorder 08/18/2022    Attention deficit hyperactivity disorder (ADHD) 08/18/2022    Chronic bilateral low back pain without sciatica 08/18/2022       1. Primary hypertension  Assessment & Plan:  Well controlled on current prescriptions.       2. Anxiety  Assessment & Plan:  Patient is currently on valium 5mg prn.  Informed patient this is not a medication I will be able to prescribe for patient.  discussed other options.  Will try hydroxyzine. New rx sent in. Take as directed. Cautioned may cause drowsiness and therefore do not take before work or driving.     Does not want to take a daily medication. Has been on paxil and wellbutrin in the past and were not effective. Not currently seeing a counselor but is willing to.    Orders:  -     hydrOXYzine HCL (ATARAX) 25 MG tablet; Take 1 tablet (25 mg total) by mouth 3 (three) times daily as needed for Anxiety. May cause drowsiness  Dispense: 40 tablet; Refill: 3    3. Postmenopausal  Assessment & Plan:  dexa and prolia is ordered by gyn      4. Age-related osteoporosis without current pathological fracture  Assessment & Plan:  See postmenopausal A&P      5. Colon cancer screening  Assessment & Plan:  cologuard ordered again    Orders:  -     Cologuard Screening (Multitarget Stool DNA); Future; Expected date: 06/15/2023    6. Vertigo  Assessment & Plan:  + fluid in ears.  Use flonase and meclizine as discussed. rx sent in. Fall precautions discussed.   Monitor symptoms. Contact clinic for concerns.     Orders:  -     fluticasone propionate (FLONASE) 50 mcg/actuation nasal spray; 1 spray (50 mcg total) by Each Nostril route once daily.  Dispense: 16 g; Refill: 11  -     meclizine (ANTIVERT) 12.5 mg tablet; Take 1 tablet (12.5 mg total) by mouth 3 (three) times daily as needed for Dizziness.  Dispense: 30 tablet; Refill: 2    7.  History of back surgery  Assessment & Plan:  With dr. Platt in [place] 5/2023. On muscle relaxer and nsaid from his office. Keep scheduled follow ups.            Follow up in about 3 months (around 9/15/2023) for anxiety.

## 2023-06-15 NOTE — ASSESSMENT & PLAN NOTE
+ fluid in ears.  Use flonase and meclizine as discussed. rx sent in. Fall precautions discussed.   Monitor symptoms. Contact clinic for concerns.

## 2023-07-24 ENCOUNTER — PATIENT MESSAGE (OUTPATIENT)
Dept: ADMINISTRATIVE | Facility: HOSPITAL | Age: 63
End: 2023-07-24
Payer: COMMERCIAL

## 2023-08-29 ENCOUNTER — PATIENT MESSAGE (OUTPATIENT)
Dept: ADMINISTRATIVE | Facility: HOSPITAL | Age: 63
End: 2023-08-29
Payer: COMMERCIAL

## 2023-09-18 ENCOUNTER — HOSPITAL ENCOUNTER (OUTPATIENT)
Dept: RADIOLOGY | Facility: HOSPITAL | Age: 63
Discharge: HOME | End: 2023-09-18
Attending: STUDENT IN AN ORGANIZED HEALTH CARE EDUCATION/TRAINING PROGRAM
Payer: COMMERCIAL

## 2023-09-18 DIAGNOSIS — Z12.31 ENCOUNTER FOR SCREENING MAMMOGRAM FOR BREAST CANCER: ICD-10-CM

## 2023-09-18 PROCEDURE — 77063 MAMMO DIGITAL SCREENING BILAT WITH TOMO: ICD-10-PCS | Mod: 26,,, | Performed by: RADIOLOGY

## 2023-09-18 PROCEDURE — 77067 SCR MAMMO BI INCL CAD: CPT | Mod: 26,,, | Performed by: RADIOLOGY

## 2023-09-18 PROCEDURE — 77063 BREAST TOMOSYNTHESIS BI: CPT | Mod: 26,,, | Performed by: RADIOLOGY

## 2023-09-18 PROCEDURE — 77067 MAMMO DIGITAL SCREENING BILAT WITH TOMO: ICD-10-PCS | Mod: 26,,, | Performed by: RADIOLOGY

## 2023-09-18 PROCEDURE — 77067 SCR MAMMO BI INCL CAD: CPT | Mod: TC

## 2023-09-26 ENCOUNTER — PATIENT MESSAGE (OUTPATIENT)
Dept: ADMINISTRATIVE | Facility: HOSPITAL | Age: 63
End: 2023-09-26
Payer: COMMERCIAL

## 2023-11-15 ENCOUNTER — PATIENT MESSAGE (OUTPATIENT)
Dept: ADMINISTRATIVE | Facility: HOSPITAL | Age: 63
End: 2023-11-15
Payer: MEDICARE

## 2023-11-24 ENCOUNTER — INFUSION (OUTPATIENT)
Dept: INFUSION THERAPY | Facility: HOSPITAL | Age: 63
End: 2023-11-24
Attending: OBSTETRICS & GYNECOLOGY
Payer: MEDICARE

## 2023-11-24 VITALS
SYSTOLIC BLOOD PRESSURE: 165 MMHG | HEART RATE: 65 BPM | DIASTOLIC BLOOD PRESSURE: 92 MMHG | OXYGEN SATURATION: 100 % | RESPIRATION RATE: 18 BRPM

## 2023-11-24 DIAGNOSIS — M81.0 OSTEOPOROSIS, UNSPECIFIED OSTEOPOROSIS TYPE, UNSPECIFIED PATHOLOGICAL FRACTURE PRESENCE: Primary | ICD-10-CM

## 2023-11-24 PROCEDURE — 96372 THER/PROPH/DIAG INJ SC/IM: CPT

## 2023-11-24 PROCEDURE — 63600175 PHARM REV CODE 636 W HCPCS: Mod: JZ,JG | Performed by: OBSTETRICS & GYNECOLOGY

## 2023-11-24 RX ADMIN — DENOSUMAB 60 MG: 60 INJECTION SUBCUTANEOUS at 11:11

## 2023-11-30 ENCOUNTER — TELEPHONE (OUTPATIENT)
Dept: FAMILY MEDICINE | Facility: CLINIC | Age: 63
End: 2023-11-30
Payer: MEDICARE

## 2023-11-30 NOTE — TELEPHONE ENCOUNTER
I called patient and spoke with her. She doesn't remember seeing Dr. Plaza and would like to see Dr. Lu. I scheduled her to see Dr. Lu. She was seeing Dr. Key previously and saw Dr. Plaza once. She is aware Dr. Lu will be her pimary pcp in this office. Michael

## 2023-11-30 NOTE — TELEPHONE ENCOUNTER
----- Message from Erica Carter sent at 11/30/2023  2:47 PM CST -----  Emory called to see if he can change his mom Dr to Dr Lu.  He states his mom is requesting the change.  He is requesting a  call back to complete the change.  His call back # is [phone]

## 2023-11-30 NOTE — TELEPHONE ENCOUNTER
----- Message from Mendy Sanford LPN sent at 11/30/2023  2:54 PM CST -----    ----- Message -----  From: Erica Carter  Sent: 11/30/2023   2:50 PM CST  To: Tabitha Beata Staff; Mela CROSS Staff    Emory called to see if he can change his mom Dr to Dr Lu.  He states his mom is requesting the change.  He is requesting a  call back to complete the change.  His call back # is [phone]

## 2024-01-03 ENCOUNTER — OFFICE VISIT (OUTPATIENT)
Dept: FAMILY MEDICINE | Facility: CLINIC | Age: 64
End: 2024-01-03
Payer: MEDICARE

## 2024-01-03 VITALS
SYSTOLIC BLOOD PRESSURE: 148 MMHG | WEIGHT: 139 LBS | OXYGEN SATURATION: 99 % | DIASTOLIC BLOOD PRESSURE: 78 MMHG | HEIGHT: 66 IN | BODY MASS INDEX: 22.34 KG/M2 | HEART RATE: 56 BPM | TEMPERATURE: 98 F

## 2024-01-03 DIAGNOSIS — F41.1 GENERALIZED ANXIETY DISORDER: Chronic | ICD-10-CM

## 2024-01-03 DIAGNOSIS — Z79.899 HIGH RISK MEDICATION USE: ICD-10-CM

## 2024-01-03 DIAGNOSIS — M15.9 PRIMARY OSTEOARTHRITIS INVOLVING MULTIPLE JOINTS: ICD-10-CM

## 2024-01-03 DIAGNOSIS — I10 PRIMARY HYPERTENSION: Primary | Chronic | ICD-10-CM

## 2024-01-03 DIAGNOSIS — M81.0 AGE-RELATED OSTEOPOROSIS WITHOUT CURRENT PATHOLOGICAL FRACTURE: ICD-10-CM

## 2024-01-03 PROBLEM — F90.9 ATTENTION DEFICIT HYPERACTIVITY DISORDER (ADHD): Status: RESOLVED | Noted: 2022-08-18 | Resolved: 2024-01-03

## 2024-01-03 PROBLEM — Z12.4 CERVICAL CANCER SCREENING: Status: RESOLVED | Noted: 2022-11-28 | Resolved: 2024-01-03

## 2024-01-03 PROBLEM — Z12.11 COLON CANCER SCREENING: Chronic | Status: RESOLVED | Noted: 2022-11-28 | Resolved: 2024-01-03

## 2024-01-03 PROBLEM — M15.0 PRIMARY OSTEOARTHRITIS INVOLVING MULTIPLE JOINTS: Status: ACTIVE | Noted: 2024-01-03

## 2024-01-03 PROBLEM — F41.9 ANXIETY: Status: RESOLVED | Noted: 2022-11-28 | Resolved: 2024-01-03

## 2024-01-03 PROBLEM — Z78.0 POSTMENOPAUSAL: Status: RESOLVED | Noted: 2023-06-15 | Resolved: 2024-01-03

## 2024-01-03 PROBLEM — M54.50 CHRONIC BILATERAL LOW BACK PAIN WITHOUT SCIATICA: Chronic | Status: RESOLVED | Noted: 2022-08-18 | Resolved: 2024-01-03

## 2024-01-03 PROBLEM — Z98.890 HISTORY OF BACK SURGERY: Status: RESOLVED | Noted: 2023-06-15 | Resolved: 2024-01-03

## 2024-01-03 PROBLEM — R42 VERTIGO: Status: RESOLVED | Noted: 2023-06-15 | Resolved: 2024-01-03

## 2024-01-03 PROBLEM — G89.29 CHRONIC BILATERAL LOW BACK PAIN WITHOUT SCIATICA: Chronic | Status: RESOLVED | Noted: 2022-08-18 | Resolved: 2024-01-03

## 2024-01-03 LAB
AMPHET UR QL SCN: NEGATIVE
BARBITURATE SCN PRESENT UR: NEGATIVE
BENZODIAZ UR QL SCN: POSITIVE
CANNABINOIDS UR QL SCN: NEGATIVE
COCAINE UR QL SCN: NEGATIVE
FENTANYL UR QL SCN: NEGATIVE
MDMA UR QL SCN: NEGATIVE
OPIATES UR QL SCN: NEGATIVE
PCP UR QL: NEGATIVE
PH UR: 6 [PH] (ref 3–11)
SPECIFIC GRAVITY, URINE AUTO (.000) (OHS): 1.02 (ref 1–1.03)

## 2024-01-03 PROCEDURE — 99214 OFFICE O/P EST MOD 30 MIN: CPT | Mod: ,,, | Performed by: STUDENT IN AN ORGANIZED HEALTH CARE EDUCATION/TRAINING PROGRAM

## 2024-01-03 PROCEDURE — 80307 DRUG TEST PRSMV CHEM ANLYZR: CPT | Performed by: STUDENT IN AN ORGANIZED HEALTH CARE EDUCATION/TRAINING PROGRAM

## 2024-01-03 RX ORDER — BACLOFEN 10 MG/1
10-20 TABLET ORAL NIGHTLY
COMMUNITY
Start: 2023-12-22

## 2024-01-03 RX ORDER — DIAZEPAM 5 MG/1
5 TABLET ORAL EVERY 12 HOURS PRN
COMMUNITY
End: 2024-02-01 | Stop reason: SDUPTHER

## 2024-01-03 RX ORDER — AMLODIPINE BESYLATE 2.5 MG/1
2.5 TABLET ORAL DAILY
COMMUNITY

## 2024-01-03 RX ORDER — CELECOXIB 200 MG/1
200 CAPSULE ORAL 2 TIMES DAILY
COMMUNITY
Start: 2023-11-30

## 2024-01-03 NOTE — PROGRESS NOTES
"Subjective:      Patient ID: Kat Frazier is a 63 y.o.  female.    Chief Complaint: Establish Care    HTN: Patient was taking Norvasc and Metoprolol, but states since she had back surgery in April her blood pressure had dropped. She says it's been running 140s/70s, but after she exercises it will go down to 120s/70s. Patient says her HR is always in the 50s.    Osteoarthritis: Patient following with Dr. Abdi Platt with [facility] in [place], [place]. She is also following with Dr. Pat Jacobs with Rheumatology. She is taking Baclofen and Celebrex.      Osteoporosis: Patient following with Dr. Ernesto Jung with OB-GYN. Patient taking Prolia and hormones per OB-GYN.    Anxiety: Patient taking Valium PRN. She states she is averaging taking it about 5 times a month.    Review of Systems   Constitutional:  Negative for activity change, appetite change, chills, diaphoresis, fatigue, fever and unexpected weight change.   Eyes:  Negative for visual disturbance.   Respiratory:  Negative for apnea, cough, shortness of breath, wheezing and stridor.    Cardiovascular:  Negative for chest pain, palpitations and leg swelling.   Gastrointestinal:  Negative for abdominal pain, blood in stool, constipation, diarrhea, nausea and vomiting.   Genitourinary:  Negative for dysuria and hematuria.   Musculoskeletal:  Positive for arthralgias, back pain and neck pain. Negative for gait problem, joint swelling and myalgias.   Skin:  Negative for rash and wound.   Neurological:  Negative for dizziness, syncope, weakness, numbness and headaches.   Psychiatric/Behavioral:  Positive for sleep disturbance. Negative for agitation, behavioral problems, confusion, decreased concentration, dysphoric mood, hallucinations, self-injury and suicidal ideas. The patient is nervous/anxious. The patient is not hyperactive.      Objective:   BP (!) 148/78   Pulse (!) 56   Temp 97.6 °F (36.4 °C) (Temporal)   Ht 5' 6" " (1.676 m)   Wt 63 kg (139 lb)   LMP  (LMP Unknown)   SpO2 99%   BMI 22.44 kg/m²     Physical Exam  Vitals and nursing note reviewed.   Constitutional:       General: She is not in acute distress.     Appearance: Normal appearance. She is not ill-appearing or toxic-appearing.   HENT:      Head: Normocephalic and atraumatic.   Eyes:      Conjunctiva/sclera: Conjunctivae normal.   Cardiovascular:      Rate and Rhythm: Normal rate and regular rhythm.      Heart sounds: Normal heart sounds. No murmur heard.  Pulmonary:      Effort: Pulmonary effort is normal. No respiratory distress.      Breath sounds: Normal breath sounds.   Abdominal:      General: There is no distension.      Palpations: Abdomen is soft.      Tenderness: There is no abdominal tenderness.   Musculoskeletal:         General: No deformity.      Right lower leg: No edema.      Left lower leg: No edema.   Skin:     General: Skin is warm and dry.      Findings: No lesion or rash.   Neurological:      General: No focal deficit present.      Mental Status: She is alert.      Motor: No weakness.      Coordination: Coordination normal.   Psychiatric:         Mood and Affect: Mood normal.         Behavior: Behavior normal.         Thought Content: Thought content normal.         Judgment: Judgment normal.       Assessment/Plan:   1. Primary hypertension  Assessment & Plan:  - Fluctuating BP per patient.  - Will trial low-dose of Norvasc 2.5mg daily.  - Patient will continue to monitor her home Bps and bring home BP machine to follow-up.   - Re-evaluation in 1 month.      2. Primary osteoarthritis involving multiple joints  Assessment & Plan:  - Stable.  - Patient following with Dr. Abdi Platt with [facility] in [place], [place].   - She is also following with Dr. Pat Jacobs with Rheumatology.   - She is taking Baclofen and Celebrex.        3. Age-related osteoporosis without current pathological fracture  Assessment & Plan:  - Patient  following with Dr. Ernesto Jung with OB-GYN.  - Patient taking Prolia and hormones per OB-GYN.      4. Generalized anxiety disorder  Assessment & Plan:  - High-Risk Medication Agreement signed by patient.  - UDS for routine monitoring.  - Will continue Valium 5mg PRN if UDS returns consistent.      5. High risk medication use  -     Drug Screen, Urine       Follow up in about 1 month (around 2/3/2024) for HTN.

## 2024-01-03 NOTE — ASSESSMENT & PLAN NOTE
- Patient following with Dr. Ernesto Jung with OB-GYN.  - Patient taking Prolia and hormones per OB-GYN.

## 2024-01-03 NOTE — ASSESSMENT & PLAN NOTE
- High-Risk Medication Agreement signed by patient.  - UDS for routine monitoring.  - Will continue Valium 5mg PRN if UDS returns consistent.

## 2024-01-03 NOTE — ASSESSMENT & PLAN NOTE
- Fluctuating BP per patient.  - Will trial low-dose of Norvasc 2.5mg daily.  - Patient will continue to monitor her home Bps and bring home BP machine to follow-up.   - Re-evaluation in 1 month.

## 2024-01-03 NOTE — ASSESSMENT & PLAN NOTE
- Stable.  - Patient following with Dr. Abdi Platt with [facility] in [place], [place].   - She is also following with Dr. Pat Jacobs with Rheumatology.   - She is taking Baclofen and Celebrex.

## 2024-02-01 ENCOUNTER — OFFICE VISIT (OUTPATIENT)
Dept: FAMILY MEDICINE | Facility: CLINIC | Age: 64
End: 2024-02-01
Payer: MEDICARE

## 2024-02-01 VITALS
HEART RATE: 77 BPM | SYSTOLIC BLOOD PRESSURE: 130 MMHG | WEIGHT: 136.38 LBS | HEIGHT: 66 IN | DIASTOLIC BLOOD PRESSURE: 78 MMHG | OXYGEN SATURATION: 98 % | RESPIRATION RATE: 16 BRPM | TEMPERATURE: 98 F | BODY MASS INDEX: 21.92 KG/M2

## 2024-02-01 DIAGNOSIS — I10 PRIMARY HYPERTENSION: Primary | Chronic | ICD-10-CM

## 2024-02-01 DIAGNOSIS — F41.1 GENERALIZED ANXIETY DISORDER: Chronic | ICD-10-CM

## 2024-02-01 PROCEDURE — 99214 OFFICE O/P EST MOD 30 MIN: CPT | Mod: ,,, | Performed by: STUDENT IN AN ORGANIZED HEALTH CARE EDUCATION/TRAINING PROGRAM

## 2024-02-01 RX ORDER — DIAZEPAM 5 MG/1
5 TABLET ORAL DAILY PRN
Qty: 30 TABLET | Refills: 0 | Status: SHIPPED | OUTPATIENT
Start: 2024-02-01

## 2024-02-01 NOTE — ASSESSMENT & PLAN NOTE
- BP well-controlled. However, patient reports fluctuating BP.  - Patient never started Norvasc. Will trial low-dose of Norvasc 2.5mg daily.  - Patient will continue to monitor her home BPs and bring home BP machine to follow-up.   - Re-evaluation in 1 month.

## 2024-02-01 NOTE — PROGRESS NOTES
"Subjective:      Patient ID: Kat Frazier is a 63 y.o.  female.    Chief Complaint: 1 Month Follow-Up for HTN    HTN: /78. Patient started on Norvasc at last office visit. Patient said she never took the Norvasc. She brought in her BP logs with a mixture of controlled and uncontrolled BPs.     Review of Systems   Constitutional:  Negative for activity change, appetite change, chills, diaphoresis, fatigue, fever and unexpected weight change.   Eyes:  Negative for visual disturbance.   Respiratory:  Negative for apnea, cough, shortness of breath, wheezing and stridor.    Cardiovascular:  Negative for chest pain, palpitations and leg swelling.   Gastrointestinal:  Negative for abdominal pain, blood in stool, constipation, diarrhea, nausea and vomiting.   Genitourinary:  Negative for dysuria and hematuria.   Musculoskeletal:  Positive for arthralgias, back pain and neck pain. Negative for gait problem, joint swelling and myalgias.   Skin:  Negative for rash and wound.   Neurological:  Negative for dizziness, syncope, weakness, numbness and headaches.   Psychiatric/Behavioral:  Positive for sleep disturbance. Negative for agitation, behavioral problems, confusion, decreased concentration, dysphoric mood, hallucinations, self-injury and suicidal ideas. The patient is nervous/anxious. The patient is not hyperactive.      Objective:   /78 (BP Location: Right arm)   Pulse 77   Temp 98.1 °F (36.7 °C) (Temporal)   Resp 16   Ht 5' 6" (1.676 m)   Wt 61.9 kg (136 lb 6.4 oz)   LMP  (LMP Unknown)   SpO2 98%   BMI 22.02 kg/m²     Physical Exam  Vitals and nursing note reviewed.   Constitutional:       General: She is not in acute distress.     Appearance: Normal appearance. She is not ill-appearing or toxic-appearing.   HENT:      Head: Normocephalic and atraumatic.   Eyes:      Conjunctiva/sclera: Conjunctivae normal.   Cardiovascular:      Rate and Rhythm: Normal rate and regular rhythm.      " Heart sounds: Normal heart sounds. No murmur heard.  Pulmonary:      Effort: Pulmonary effort is normal. No respiratory distress.      Breath sounds: Normal breath sounds.   Abdominal:      General: There is no distension.      Palpations: Abdomen is soft.      Tenderness: There is no abdominal tenderness.   Musculoskeletal:         General: No deformity.      Right lower leg: No edema.      Left lower leg: No edema.   Skin:     General: Skin is warm and dry.      Findings: No lesion or rash.   Neurological:      General: No focal deficit present.      Mental Status: She is alert.      Motor: No weakness.      Coordination: Coordination normal.   Psychiatric:         Mood and Affect: Mood normal.         Behavior: Behavior normal.         Thought Content: Thought content normal.         Judgment: Judgment normal.       Assessment/Plan:   1. Primary hypertension  Assessment & Plan:  - BP well-controlled. However, patient reports fluctuating BP.  - Patient never started Norvasc. Will trial low-dose of Norvasc 2.5mg daily.  - Patient will continue to monitor her home BPs and bring home BP machine to follow-up.   - Re-evaluation in 1 month.      2. Generalized anxiety disorder  Assessment & Plan:  - High-Risk Medication Agreement signed by patient on 01/03/24  - UDS UTD from 01/03/24.  - Continue Valium 5mg PRN.    Orders:  -     diazePAM (VALIUM) 5 MG tablet; Take 1 tablet (5 mg total) by mouth daily as needed for Anxiety.  Dispense: 30 tablet; Refill: 0       Follow up in about 1 month (around 3/1/2024) for HTN.

## 2024-02-01 NOTE — ASSESSMENT & PLAN NOTE
- High-Risk Medication Agreement signed by patient on 01/03/24  - UDS UTD from 01/03/24.  - Continue Valium 5mg PRN.

## 2024-03-05 ENCOUNTER — OFFICE VISIT (OUTPATIENT)
Dept: URGENT CARE | Facility: CLINIC | Age: 64
End: 2024-03-05
Payer: MEDICARE

## 2024-03-05 VITALS
BODY MASS INDEX: 21.86 KG/M2 | WEIGHT: 136 LBS | DIASTOLIC BLOOD PRESSURE: 89 MMHG | RESPIRATION RATE: 16 BRPM | TEMPERATURE: 99 F | SYSTOLIC BLOOD PRESSURE: 168 MMHG | HEART RATE: 64 BPM | OXYGEN SATURATION: 100 % | HEIGHT: 66 IN

## 2024-03-05 DIAGNOSIS — L03.119 CELLULITIS OF LOWER EXTREMITY, UNSPECIFIED LATERALITY: Primary | ICD-10-CM

## 2024-03-05 PROCEDURE — 99213 OFFICE O/P EST LOW 20 MIN: CPT | Mod: ,,, | Performed by: FAMILY MEDICINE

## 2024-03-05 RX ORDER — CEPHALEXIN 500 MG/1
500 CAPSULE ORAL EVERY 12 HOURS
Qty: 14 CAPSULE | Refills: 0 | Status: SHIPPED | OUTPATIENT
Start: 2024-03-05 | End: 2024-03-12

## 2024-03-05 RX ORDER — MUPIROCIN 20 MG/G
OINTMENT TOPICAL 3 TIMES DAILY
COMMUNITY
Start: 2024-03-02 | End: 2024-03-22

## 2024-03-05 NOTE — PROGRESS NOTES
"Subjective:      Patient ID: Kat Frazier is a 63 y.o. female.    Vitals:  height is 5' 6" (1.676 m) and weight is 61.7 kg (136 lb). Her temperature is 98.7 °F (37.1 °C). Her blood pressure is 168/89 (abnormal) and her pulse is 64. Her respiration is 16 and oxygen saturation is 100%.     Chief Complaint: Laceration (Right lower leg laceration 1 week ago on Saturday. Tried applying neosporin. Called her Dermatology friend and was prescribed abx oint. No relief. )    7 days go had a RLE lac, presents for concern of erythema.  Using antibiotic ointment.  No fever.         Constitution: Negative for sweating, fatigue and fever.   Cardiovascular:  Negative for chest pain and palpitations.   Skin:  Positive for laceration.   Neurological:  Negative for dizziness and altered mental status.   Psychiatric/Behavioral:  Negative for altered mental status.       Objective:     Physical Exam   Cardiovascular: Normal rate.   Pulmonary/Chest: Effort normal.   Abdominal: Normal appearance.   Neurological: She is alert.   Skin: Capillary refill takes less than 2 seconds.         Comments: L anterior shin with 4x1cm erythematous crusted lesion with 3cm surrounding erythema.  No fluctuance.    Psychiatric: Mood normal.   Nursing note and vitals reviewed.      Assessment:     1. Cellulitis of lower extremity, unspecified laterality        Plan:       Cellulitis of lower extremity, unspecified laterality  -     cephALEXin (KEFLEX) 500 MG capsule; Take 1 capsule (500 mg total) by mouth every 12 (twelve) hours. for 7 days  Dispense: 14 capsule; Refill: 0                    "

## 2024-03-05 NOTE — PATIENT INSTRUCTIONS
Keflex with food twice a day.  Clean daily with soap and water.  Allow the top to crust for at least 3 days.  Then you can apply ointment daily like Vaseline.

## 2024-03-22 ENCOUNTER — OFFICE VISIT (OUTPATIENT)
Dept: FAMILY MEDICINE | Facility: CLINIC | Age: 64
End: 2024-03-22
Payer: MEDICARE

## 2024-03-22 VITALS
BODY MASS INDEX: 21.78 KG/M2 | SYSTOLIC BLOOD PRESSURE: 137 MMHG | HEART RATE: 64 BPM | TEMPERATURE: 98 F | RESPIRATION RATE: 16 BRPM | HEIGHT: 66 IN | WEIGHT: 135.5 LBS | OXYGEN SATURATION: 99 % | DIASTOLIC BLOOD PRESSURE: 83 MMHG

## 2024-03-22 DIAGNOSIS — R03.0 ELEVATED BP WITHOUT DIAGNOSIS OF HYPERTENSION: Primary | ICD-10-CM

## 2024-03-22 DIAGNOSIS — Z11.4 ENCOUNTER FOR SCREENING FOR HIV: ICD-10-CM

## 2024-03-22 DIAGNOSIS — E55.9 VITAMIN D DEFICIENCY: ICD-10-CM

## 2024-03-22 DIAGNOSIS — R73.9 HYPERGLYCEMIA: ICD-10-CM

## 2024-03-22 DIAGNOSIS — Z11.59 ENCOUNTER FOR HEPATITIS C SCREENING TEST FOR LOW RISK PATIENT: ICD-10-CM

## 2024-03-22 DIAGNOSIS — Z00.00 MEDICARE ANNUAL WELLNESS VISIT, INITIAL: ICD-10-CM

## 2024-03-22 PROCEDURE — 99214 OFFICE O/P EST MOD 30 MIN: CPT | Mod: ,,, | Performed by: STUDENT IN AN ORGANIZED HEALTH CARE EDUCATION/TRAINING PROGRAM

## 2024-03-22 NOTE — PROGRESS NOTES
"Subjective:      Patient ID: Kat Frazier is a 63 y.o.  female.    Chief Complaint: Elevated BP Follow-Up    Elevated BP: /83. Patient trialed on low-dose of Norvasc 2.5mg daily due to fluctuating BPs. She said even the low dose of Norvasc dropped her BP too low and she felt bad. Patient evaluated by Dr. Ken Flower with Cardiology earlier this week and wore a 48-hr Holter monitor. She is scheduled for an upcoming calcium score as well. Patient was informed she doesn't have high blood pressure and it's likely due to anxiety.    Review of Systems   Constitutional:  Negative for activity change, appetite change, chills, diaphoresis, fatigue, fever and unexpected weight change.   Eyes:  Negative for visual disturbance.   Respiratory:  Negative for apnea, cough, shortness of breath, wheezing and stridor.    Cardiovascular:  Positive for palpitations. Negative for chest pain and leg swelling.   Gastrointestinal:  Negative for abdominal pain, blood in stool, constipation, diarrhea, nausea and vomiting.   Genitourinary:  Negative for dysuria and hematuria.   Musculoskeletal:  Positive for arthralgias, back pain and neck pain. Negative for gait problem, joint swelling and myalgias.   Skin:  Negative for rash and wound.   Neurological:  Negative for dizziness, syncope, weakness, numbness and headaches.   Psychiatric/Behavioral:  Positive for sleep disturbance. Negative for agitation, behavioral problems, confusion, decreased concentration, dysphoric mood, hallucinations, self-injury and suicidal ideas. The patient is nervous/anxious. The patient is not hyperactive.      Objective:   /83 (BP Location: Right arm)   Pulse 64   Temp 97.7 °F (36.5 °C) (Temporal)   Resp 16   Ht 5' 6" (1.676 m)   Wt 61.5 kg (135 lb 8 oz)   LMP  (LMP Unknown)   SpO2 99%   BMI 21.87 kg/m²     Physical Exam  Vitals and nursing note reviewed.   Constitutional:       General: She is not in acute distress.     " Appearance: Normal appearance. She is not ill-appearing or toxic-appearing.   HENT:      Head: Normocephalic and atraumatic.   Eyes:      Conjunctiva/sclera: Conjunctivae normal.   Cardiovascular:      Rate and Rhythm: Normal rate and regular rhythm.      Heart sounds: Normal heart sounds. No murmur heard.  Pulmonary:      Effort: Pulmonary effort is normal. No respiratory distress.      Breath sounds: Normal breath sounds.   Abdominal:      General: There is no distension.      Palpations: Abdomen is soft.      Tenderness: There is no abdominal tenderness.   Musculoskeletal:         General: No deformity.      Right lower leg: No edema.      Left lower leg: No edema.   Skin:     General: Skin is warm and dry.      Findings: No lesion or rash.   Neurological:      General: No focal deficit present.      Mental Status: She is alert. Mental status is at baseline.      Motor: No weakness.      Coordination: Coordination normal.   Psychiatric:         Mood and Affect: Mood normal.         Behavior: Behavior normal.         Thought Content: Thought content normal.         Judgment: Judgment normal.       Assessment/Plan:   1. Elevated BP without diagnosis of hypertension  Comments:  - BP well-controlled.  - Continue to monitor.  - Patient undergoing evaluation by Cardiology as well.    2. Medicare annual wellness visit, initial  -     Hepatitis C Antibody; Future; Expected date: 06/22/2024  -     HIV 1/2 Ag/Ab (4th Gen); Future; Expected date: 06/22/2024  -     TSH; Future; Expected date: 06/22/2024  -     Lipid Panel; Future; Expected date: 06/22/2024  -     Comprehensive Metabolic Panel; Future; Expected date: 06/22/2024    3. Hyperglycemia  -     CBC Auto Differential; Future; Expected date: 06/22/2024  -     Hemoglobin A1C; Future; Expected date: 06/22/2024    4. Encounter for screening for HIV  -     HIV 1/2 Ag/Ab (4th Gen); Future; Expected date: 06/22/2024    5. Encounter for hepatitis C screening test for low  risk patient  -     Hepatitis C Antibody; Future; Expected date: 06/22/2024    6. Vitamin D deficiency  -     CBC Auto Differential; Future; Expected date: 06/22/2024  -     Vitamin D; Future; Expected date: 06/22/2024       Follow up in about 3 months (around 6/22/2024) for Medicare Wellness.

## 2024-05-24 ENCOUNTER — INFUSION (OUTPATIENT)
Dept: INFUSION THERAPY | Facility: HOSPITAL | Age: 64
End: 2024-05-24
Attending: OBSTETRICS & GYNECOLOGY
Payer: MEDICARE

## 2024-05-24 VITALS — SYSTOLIC BLOOD PRESSURE: 174 MMHG | RESPIRATION RATE: 16 BRPM | HEART RATE: 60 BPM | DIASTOLIC BLOOD PRESSURE: 79 MMHG

## 2024-05-24 DIAGNOSIS — M81.0 OSTEOPOROSIS, UNSPECIFIED OSTEOPOROSIS TYPE, UNSPECIFIED PATHOLOGICAL FRACTURE PRESENCE: Primary | ICD-10-CM

## 2024-05-24 PROCEDURE — 63600175 PHARM REV CODE 636 W HCPCS: Mod: JZ,JG | Performed by: STUDENT IN AN ORGANIZED HEALTH CARE EDUCATION/TRAINING PROGRAM

## 2024-05-24 PROCEDURE — 96372 THER/PROPH/DIAG INJ SC/IM: CPT

## 2024-05-24 RX ADMIN — DENOSUMAB 60 MG: 60 INJECTION SUBCUTANEOUS at 11:05

## 2024-05-24 NOTE — PLAN OF CARE
Patient tolerated scheduled treatment; discharged home with next appt. Pt is having labs followed by Dr. Jung

## 2024-06-26 ENCOUNTER — OFFICE VISIT (OUTPATIENT)
Dept: FAMILY MEDICINE | Facility: CLINIC | Age: 64
End: 2024-06-26
Payer: MEDICARE

## 2024-06-26 VITALS
RESPIRATION RATE: 16 BRPM | BODY MASS INDEX: 22.39 KG/M2 | HEART RATE: 60 BPM | TEMPERATURE: 98 F | DIASTOLIC BLOOD PRESSURE: 82 MMHG | HEIGHT: 66 IN | OXYGEN SATURATION: 100 % | WEIGHT: 139.31 LBS | SYSTOLIC BLOOD PRESSURE: 137 MMHG

## 2024-06-26 DIAGNOSIS — F41.1 GENERALIZED ANXIETY DISORDER: Chronic | ICD-10-CM

## 2024-06-26 DIAGNOSIS — Z00.00 MEDICARE ANNUAL WELLNESS VISIT, INITIAL: Primary | ICD-10-CM

## 2024-06-26 DIAGNOSIS — M15.9 PRIMARY OSTEOARTHRITIS INVOLVING MULTIPLE JOINTS: ICD-10-CM

## 2024-06-26 DIAGNOSIS — E55.9 VITAMIN D DEFICIENCY: ICD-10-CM

## 2024-06-26 DIAGNOSIS — K58.1 IRRITABLE BOWEL SYNDROME WITH CONSTIPATION: ICD-10-CM

## 2024-06-26 DIAGNOSIS — M81.0 AGE-RELATED OSTEOPOROSIS WITHOUT CURRENT PATHOLOGICAL FRACTURE: ICD-10-CM

## 2024-06-26 PROCEDURE — G0402 INITIAL PREVENTIVE EXAM: HCPCS | Mod: ,,, | Performed by: STUDENT IN AN ORGANIZED HEALTH CARE EDUCATION/TRAINING PROGRAM

## 2024-06-26 RX ORDER — DIAZEPAM 5 MG/1
5 TABLET ORAL DAILY PRN
Qty: 30 TABLET | Refills: 0 | Status: SHIPPED | OUTPATIENT
Start: 2024-06-26

## 2024-06-26 NOTE — ASSESSMENT & PLAN NOTE
- Stable.  - Patient was following with Dr. Abdi Platt with [facility] in [place], [place].   - She is also following with Dr. Pat Jacobs with Rheumatology.   - She is taking Baclofen and Celebrex.

## 2024-06-26 NOTE — PROGRESS NOTES
"Subjective:      Patient ID: Kat Frazier is a 63 y.o.  female.    Chief Complaint: Medicare Wellness    Preventative Health: Labs pending.     Osteoarthritis: Patient was following with Dr. Abdi Platt with [facility] in [place], [place]. She is also following with Dr. Pat Jacobs with Rheumatology. She is taking Baclofen and Celebrex.       Osteoporosis: Patient following with Dr. Ernesto Jung with OB-GYN. Patient taking Prolia and hormones per OB-GYN.     Anxiety: Patient taking Valium PRN.    Abdominal Bloating/Gas/Constipation: Patient taking Miralax daily, probiotics, and magnesium citrate PRN OTC.    Review of Systems   Constitutional:  Negative for activity change, appetite change, chills, diaphoresis, fatigue, fever and unexpected weight change.   Eyes:  Negative for visual disturbance.   Respiratory:  Negative for apnea, cough, shortness of breath, wheezing and stridor.    Cardiovascular:  Negative for chest pain, palpitations and leg swelling.   Gastrointestinal:  Positive for abdominal distention (bloating/gas) and constipation. Negative for abdominal pain, blood in stool, diarrhea, nausea and vomiting.   Genitourinary:  Negative for dysuria and hematuria.   Musculoskeletal:  Positive for arthralgias, back pain and neck pain. Negative for gait problem, joint swelling and myalgias.   Skin:  Negative for rash and wound.   Neurological:  Negative for dizziness, syncope, weakness, numbness and headaches.   Psychiatric/Behavioral:  Positive for sleep disturbance. Negative for agitation, behavioral problems, confusion, decreased concentration, dysphoric mood, hallucinations, self-injury and suicidal ideas. The patient is nervous/anxious. The patient is not hyperactive.      Objective:   /82 (BP Location: Right arm)   Pulse 60   Temp 97.8 °F (36.6 °C) (Temporal)   Resp 16   Ht 5' 6" (1.676 m)   Wt 63.2 kg (139 lb 4.8 oz)   LMP  (LMP Unknown)   SpO2 100%   BMI " 22.48 kg/m²     Physical Exam  Vitals and nursing note reviewed.   Constitutional:       General: She is not in acute distress.     Appearance: Normal appearance. She is not ill-appearing or toxic-appearing.   HENT:      Head: Normocephalic and atraumatic.      Mouth/Throat:      Mouth: Mucous membranes are moist.      Pharynx: Oropharynx is clear.   Eyes:      Conjunctiva/sclera: Conjunctivae normal.   Cardiovascular:      Rate and Rhythm: Normal rate and regular rhythm.      Heart sounds: Normal heart sounds. No murmur heard.  Pulmonary:      Effort: Pulmonary effort is normal. No respiratory distress.      Breath sounds: Normal breath sounds.   Abdominal:      General: There is no distension.      Palpations: Abdomen is soft. There is no mass.      Tenderness: There is no abdominal tenderness. There is no guarding.   Musculoskeletal:         General: No deformity.      Cervical back: Neck supple. No tenderness.      Right lower leg: No edema.      Left lower leg: No edema.   Lymphadenopathy:      Cervical: No cervical adenopathy.   Skin:     General: Skin is warm and dry.      Findings: No lesion or rash.   Neurological:      General: No focal deficit present.      Mental Status: She is alert. Mental status is at baseline.      Motor: No weakness.      Coordination: Coordination normal.   Psychiatric:         Mood and Affect: Mood normal.         Behavior: Behavior normal.         Thought Content: Thought content normal.         Judgment: Judgment normal.       Assessment/Plan:   1. Medicare annual wellness visit, initial  Comments:  - Health maintenance reviewed.  - Labs pending.    2. Primary osteoarthritis involving multiple joints  Assessment & Plan:  - Stable.  - Patient was following with Dr. Abdi Platt with [facility] in [place], [place].   - She is also following with Dr. Pat Jacobs with Rheumatology.   - She is taking Baclofen and Celebrex.        3. Age-related osteoporosis without  current pathological fracture  Assessment & Plan:  - Patient following with Dr. Ernesto Jung with OB-GYN.  - Patient taking Prolia and hormones per OB-GYN.      4. Generalized anxiety disorder  Assessment & Plan:  - High-Risk Medication Agreement signed by patient on 01/03/24  - UDS UTD from 01/03/24.  - Continue Valium 5mg PRN.    Orders:  -     diazePAM (VALIUM) 5 MG tablet; Take 1 tablet (5 mg total) by mouth daily as needed for Anxiety.  Dispense: 30 tablet; Refill: 0    5. Vitamin D deficiency  Assessment & Plan:  - Vitamin D level for routine monitoring.      6. Irritable bowel syndrome with constipation  Assessment & Plan:  - Linzess samples given to patient. She will contact the clinic to let us know how she's doing on them and if she would like a prescription.            Opioid Screening: Patient medication list reviewed, patient is not taking prescription opioids. Patient is not using additional opioids than prescribed. Patient is at low risk of substance abuse based on this opioid use history.     Patient Reported Health Risk Assessment  What [age] 65-67  Are you male or female?: Female  During the past four weeks, how much have you been bothered by emotional problems such as feeling anxious, depressed, irritable, sad, or downhearted and blue?: Not at all  During the past five weeks, has your physical and/or emotional health limited your social activities with family, friends, neighbors, or groups?: Not at all  During the past four weeks, how much bodily pain have you generally had?: Moderate pain  During the past four weeks, was someone available to help if you needed and wanted help?: Yes, as much as I wanted  During the past four weeks, what was the hardest physical activity you could do for at least two minutes?: Moderate  Can you get to places out of walking distance without help?  (For example, can you travel alone on buses or taxis, or drive your own car?): Yes  Can you go shopping for  groceries or clothes without someone's help?: Yes  Can you prepare your own meals?: Yes  Can you do your own housework without help?: Yes  Because of any health problems, do you need the help of another person with your personal care needs such as eating, bathing, dressing, or getting around the house?: No  Can you handle your own money without help?: Yes  During the past four weeks, how would you rate your health in general?: Very good  How have things been going for you during the past four weeks?: Pretty well  Are you having difficulties driving your car?: No  Do you always fasten your seat belt when you are in a car?: Yes, usually  How often in the past four weeks have you been bothered by falling or dizzy when standing up?: Never  How often in the past four weeks have you been bothered by sexual problems?: Never  How often in the past four weeks have you been bothered by trouble eating well?: Never  How often in the past four weeks have you been bothered by teeth or denture problems?: Never  How often in the past four weeks have you been bothered with problems using the telephone?: Never  How often in the past four weeks have you been bothered by tiredness or fatigue?: Sometimes  Have you fallen two or more times in the past year?: No  Are you afraid of falling?: No  Are you a smoker?: No  During the past four weeks, how many drinks of wine, beer, or other alcoholic beverages did you have?: One drink or less per week  Do you exercise for about 20 minutes three or more days a week?: Yes, most of the time  Have you been given any information to help you with hazards in your house that might hurt you?: Yes  Have you been given any information to help you with keeping track of your medications?: Yes  How often do you have trouble taking medicines the way you've been told to take them?: I always take them as prescribed  How confident are you that you can control and manage most of your health problems?: Very  confident  What is your race? (Check all that apply.):     Medicare Annual Wellness and Personalized Prevention Plan:   Fall Risk + Home Safety + Hearing Impairment + Depression Screen + Opioid and Substance Abuse Screening + Cognitive Impairment Screen + Health Risk Assessment all reviewed.     Advance Care Planning   I attest to discussing Advance Care Planning with patient and/or family member.  Education was provided including the importance of the Health Care Power of , Advance Directives, and/or [place] documentation.  The patient expressed understanding to the importance of this information and discussion.       Follow up in about 3 months (around 9/26/2024) for Chronic Medical Management.

## 2024-06-26 NOTE — ASSESSMENT & PLAN NOTE
- Linzess samples given to patient. She will contact the clinic to let us know how she's doing on them and if she would like a prescription.

## 2024-07-12 ENCOUNTER — TELEPHONE (OUTPATIENT)
Dept: FAMILY MEDICINE | Facility: CLINIC | Age: 64
End: 2024-07-12
Payer: MEDICARE

## 2024-07-12 DIAGNOSIS — K58.1 IRRITABLE BOWEL SYNDROME WITH CONSTIPATION: Primary | ICD-10-CM

## 2024-07-12 NOTE — TELEPHONE ENCOUNTER
I have ordered the following medication. Please notify the patient.    Medications Ordered This Encounter   Medications    linaCLOtide (LINZESS) 290 mcg Cap capsule     Sig: Take 1 capsule (290 mcg total) by mouth before breakfast.     Dispense:  90 capsule     Refill:  3

## 2024-07-12 NOTE — TELEPHONE ENCOUNTER
----- Message from Kwame Cooper sent at 7/12/2024  9:08 AM CDT -----  .Type:  Patient Returning Call    Who Called:pt   Who Left Message for Patient:  Does the patient know what this is regarding?:states she wants a Rx of Linzess 290 mg   Would the patient rather a call back or a response via [facility]? Call back   Best Call Back Number:[phone]  Additional Information: pharmacy of choice [facility] on [address]

## 2024-07-12 NOTE — TELEPHONE ENCOUNTER
----- Message from Kwame Cooper sent at 7/12/2024  9:08 AM CDT -----  .Type:  Patient Returning Call    Who Called:pt   Who Left Message for Patient:  Does the patient know what this is regarding?:states she wants a Rx of Linzess 290 mg   Would the patient rather a call back or a response via [facility]? Call back   Best Call Back Number:[phone]  Additional Information: pharmacy of choice [facility] on [address]

## 2024-07-13 ENCOUNTER — TELEPHONE (OUTPATIENT)
Dept: FAMILY MEDICINE | Facility: CLINIC | Age: 64
End: 2024-07-13
Payer: MEDICARE

## 2024-07-13 DIAGNOSIS — K58.1 IRRITABLE BOWEL SYNDROME WITH CONSTIPATION: ICD-10-CM

## 2024-07-13 NOTE — TELEPHONE ENCOUNTER
----- Message from Lilia Greene sent at 7/12/2024  1:19 PM CDT -----  Regarding: med change  .Type:  Needs Medical Advice    Who Called: pt  Symptoms (please be specific):    How long has patient had these symptoms:    Pharmacy name and phone #:    Would the patient rather a call back or a response via [facility]?   Best Call Back Number: [phone]  Additional Information: pt request generic form of med linaCLOtide (LINZESS) 290 mcg Cap capsule  Please advise pt when changed

## 2024-07-15 NOTE — TELEPHONE ENCOUNTER
This is Dr. Abad covering for Dr. Lu 7/15/2024  Generic preference noted on new Rx sent today  Please notify patient    I have signed for the following orders AND/OR meds. Please notify the patient and ask the patient to schedule the testing and/or information about any medications that were sent.      Medications Ordered This Encounter   Medications    linaCLOtide (LINZESS) 290 mcg Cap capsule     Sig: Take 1 capsule (290 mcg total) by mouth before breakfast.     Dispense:  90 capsule     Refill:  3     GENERIC OPTION PREFERRED     No orders of the defined types were placed in this encounter.

## 2024-07-16 ENCOUNTER — TELEPHONE (OUTPATIENT)
Dept: FAMILY MEDICINE | Facility: CLINIC | Age: 64
End: 2024-07-16
Payer: MEDICARE

## 2024-07-16 DIAGNOSIS — K58.1 IRRITABLE BOWEL SYNDROME WITH CONSTIPATION: Primary | ICD-10-CM

## 2024-07-16 RX ORDER — DICYCLOMINE HYDROCHLORIDE 20 MG/1
20 TABLET ORAL
Qty: 360 TABLET | Refills: 3 | Status: SHIPPED | OUTPATIENT
Start: 2024-07-16 | End: 2024-08-15

## 2024-07-16 NOTE — TELEPHONE ENCOUNTER
----- Message from Odilon Kirby sent at 7/15/2024  2:39 PM CDT -----  Type:  Needs Medical Advice    Who Called: pt   Symptoms (please be specific):    How long has patient had these symptoms:    Pharmacy name and phone #:  [facility] [place] [facility] [address] [facility] [address] [facility]    Would the patient rather a call back or a response via [facility]?  Best Call Back Number: [phone]     Additional Information: pt would like to discuss alternative medication to ,  linaCLOtide (LINZESS) 290 mcg Cap capsule

## 2024-10-24 ENCOUNTER — HOSPITAL ENCOUNTER (OUTPATIENT)
Dept: RADIOLOGY | Facility: HOSPITAL | Age: 64
Discharge: HOME | End: 2024-10-24
Attending: STUDENT IN AN ORGANIZED HEALTH CARE EDUCATION/TRAINING PROGRAM
Payer: MEDICARE

## 2024-10-24 DIAGNOSIS — Z12.31 ENCOUNTER FOR SCREENING MAMMOGRAM FOR BREAST CANCER: ICD-10-CM

## 2024-10-24 DIAGNOSIS — M81.0 OP (OSTEOPOROSIS): ICD-10-CM

## 2024-10-24 PROCEDURE — 77063 BREAST TOMOSYNTHESIS BI: CPT | Mod: 26,,, | Performed by: STUDENT IN AN ORGANIZED HEALTH CARE EDUCATION/TRAINING PROGRAM

## 2024-10-24 PROCEDURE — 77080 DXA BONE DENSITY AXIAL: CPT | Mod: TC

## 2024-10-24 PROCEDURE — 77067 SCR MAMMO BI INCL CAD: CPT | Mod: 26,,, | Performed by: STUDENT IN AN ORGANIZED HEALTH CARE EDUCATION/TRAINING PROGRAM

## 2024-10-24 PROCEDURE — 77063 BREAST TOMOSYNTHESIS BI: CPT | Mod: TC

## 2024-10-24 PROCEDURE — 77067 SCR MAMMO BI INCL CAD: CPT | Mod: TC

## 2024-11-21 ENCOUNTER — LAB VISIT (OUTPATIENT)
Dept: LAB | Facility: HOSPITAL | Age: 64
End: 2024-11-21
Attending: STUDENT IN AN ORGANIZED HEALTH CARE EDUCATION/TRAINING PROGRAM
Payer: MEDICARE

## 2024-11-21 DIAGNOSIS — Z00.00 ROUTINE GENERAL MEDICAL EXAMINATION AT A HEALTH CARE FACILITY: Primary | ICD-10-CM

## 2024-11-21 LAB
ALBUMIN SERPL-MCNC: 4 G/DL (ref 3.4–4.8)
ALBUMIN/GLOB SERPL: 1.4 RATIO (ref 1.1–2)
ALP SERPL-CCNC: 50 UNIT/L (ref 40–150)
ALT SERPL-CCNC: 32 UNIT/L (ref 0–55)
ANION GAP SERPL CALC-SCNC: 6 MEQ/L
AST SERPL-CCNC: 24 UNIT/L (ref 5–34)
BILIRUB SERPL-MCNC: 0.5 MG/DL
BUN SERPL-MCNC: 17.2 MG/DL (ref 9.8–20.1)
CALCIUM SERPL-MCNC: 9.7 MG/DL (ref 8.4–10.2)
CHLORIDE SERPL-SCNC: 106 MMOL/L (ref 98–107)
CO2 SERPL-SCNC: 28 MMOL/L (ref 23–31)
CREAT SERPL-MCNC: 0.88 MG/DL (ref 0.55–1.02)
CREAT/UREA NIT SERPL: 20
GFR SERPLBLD CREATININE-BSD FMLA CKD-EPI: >60 ML/MIN/1.73/M2
GLOBULIN SER-MCNC: 2.9 GM/DL (ref 2.4–3.5)
GLUCOSE SERPL-MCNC: 112 MG/DL (ref 82–115)
POTASSIUM SERPL-SCNC: 4.4 MMOL/L (ref 3.5–5.1)
PROT SERPL-MCNC: 6.9 GM/DL (ref 5.8–7.6)
SODIUM SERPL-SCNC: 140 MMOL/L (ref 136–145)

## 2024-11-21 PROCEDURE — 36415 COLL VENOUS BLD VENIPUNCTURE: CPT

## 2024-11-21 PROCEDURE — 80053 COMPREHEN METABOLIC PANEL: CPT

## 2024-11-26 ENCOUNTER — INFUSION (OUTPATIENT)
Dept: INFUSION THERAPY | Facility: HOSPITAL | Age: 64
End: 2024-11-26
Attending: OBSTETRICS & GYNECOLOGY
Payer: MEDICARE

## 2024-11-26 DIAGNOSIS — M81.0 OSTEOPOROSIS, UNSPECIFIED OSTEOPOROSIS TYPE, UNSPECIFIED PATHOLOGICAL FRACTURE PRESENCE: Primary | ICD-10-CM

## 2024-11-26 PROCEDURE — 63600175 PHARM REV CODE 636 W HCPCS: Mod: JZ,JG

## 2024-11-26 PROCEDURE — 96372 THER/PROPH/DIAG INJ SC/IM: CPT

## 2024-11-26 RX ADMIN — DENOSUMAB 60 MG: 60 INJECTION SUBCUTANEOUS at 02:11

## 2024-11-26 NOTE — NURSING
1425-pt here for q6mo prolia inj, reporting recent root canal on 9/18/24; pt able to get dental office on the phone to confirm date, nurse at dental office able to check with Dr. Eva Aquino giving verbal ok to proceed with prolia today 10 weeks after root canal.  Able to relay this info to our radha Thibodeaux pharmacist further requesting clarification with ordering MD.    1440-able to speak with nurse Brandee at Dr Jung's office; gave verbal ok to proceed with prolia inj today 10 weeks after root canal procedure.  Pt updated and verb unstg.    Prolia inj given; pt tolerated well.  Next appts given.

## 2024-12-09 ENCOUNTER — TELEPHONE (OUTPATIENT)
Dept: FAMILY MEDICINE | Facility: CLINIC | Age: 64
End: 2024-12-09
Payer: MEDICARE

## 2024-12-09 NOTE — TELEPHONE ENCOUNTER
----- Message from Roberto sent at 12/9/2024  8:20 AM CST -----  Who Called: Kat Marin Freddie    Patient is returning phone call    Who Left Message for Patient:  Does the patient know what this is regarding?:add orders      Preferred Method of Contact: Phone Call  Patient's Preferred Phone Number on File: [phone]   Best Call Back Number, if different:  Additional Information: pt called asking can hormone levels be added to labs before she goes do them pls advise

## 2024-12-13 ENCOUNTER — LAB VISIT (OUTPATIENT)
Dept: LAB | Facility: HOSPITAL | Age: 64
End: 2024-12-13
Attending: FAMILY MEDICINE
Payer: MEDICARE

## 2024-12-13 ENCOUNTER — TELEPHONE (OUTPATIENT)
Dept: FAMILY MEDICINE | Facility: CLINIC | Age: 64
End: 2024-12-13
Payer: MEDICARE

## 2024-12-13 DIAGNOSIS — Z11.4 ENCOUNTER FOR SCREENING FOR HIV: ICD-10-CM

## 2024-12-13 DIAGNOSIS — Z00.00 MEDICARE ANNUAL WELLNESS VISIT, INITIAL: ICD-10-CM

## 2024-12-13 DIAGNOSIS — Z11.59 ENCOUNTER FOR HEPATITIS C SCREENING TEST FOR LOW RISK PATIENT: ICD-10-CM

## 2024-12-13 DIAGNOSIS — R73.9 HYPERGLYCEMIA: ICD-10-CM

## 2024-12-13 DIAGNOSIS — E55.9 VITAMIN D DEFICIENCY: ICD-10-CM

## 2024-12-13 LAB
25(OH)D3+25(OH)D2 SERPL-MCNC: 54 NG/ML (ref 30–80)
ALBUMIN SERPL-MCNC: 3.7 G/DL (ref 3.4–4.8)
ALBUMIN/GLOB SERPL: 1.4 RATIO (ref 1.1–2)
ALP SERPL-CCNC: 41 UNIT/L (ref 40–150)
ALT SERPL-CCNC: 31 UNIT/L (ref 0–55)
ANION GAP SERPL CALC-SCNC: 7 MEQ/L
AST SERPL-CCNC: 21 UNIT/L (ref 5–34)
BASOPHILS # BLD AUTO: 0.07 X10(3)/MCL
BASOPHILS NFR BLD AUTO: 0.7 %
BILIRUB SERPL-MCNC: 0.5 MG/DL
BUN SERPL-MCNC: 17.6 MG/DL (ref 9.8–20.1)
CALCIUM SERPL-MCNC: 8.7 MG/DL (ref 8.4–10.2)
CHLORIDE SERPL-SCNC: 109 MMOL/L (ref 98–107)
CHOLEST SERPL-MCNC: 152 MG/DL
CHOLEST/HDLC SERPL: 3 {RATIO} (ref 0–5)
CO2 SERPL-SCNC: 28 MMOL/L (ref 23–31)
CREAT SERPL-MCNC: 0.76 MG/DL (ref 0.55–1.02)
CREAT/UREA NIT SERPL: 23
EOSINOPHIL # BLD AUTO: 0.25 X10(3)/MCL (ref 0–0.9)
EOSINOPHIL NFR BLD AUTO: 2.5 %
ERYTHROCYTE [DISTWIDTH] IN BLOOD BY AUTOMATED COUNT: 13.8 % (ref 11.5–17)
EST. AVERAGE GLUCOSE BLD GHB EST-MCNC: 105.4 MG/DL
GFR SERPLBLD CREATININE-BSD FMLA CKD-EPI: >60 ML/MIN/1.73/M2
GLOBULIN SER-MCNC: 2.6 GM/DL (ref 2.4–3.5)
GLUCOSE SERPL-MCNC: 94 MG/DL (ref 82–115)
HBA1C MFR BLD: 5.3 %
HCT VFR BLD AUTO: 41.4 % (ref 37–47)
HCV AB SERPL QL IA: NONREACTIVE
HDLC SERPL-MCNC: 55 MG/DL (ref 35–60)
HGB BLD-MCNC: 13.7 G/DL (ref 12–16)
HIV 1+2 AB+HIV1 P24 AG SERPL QL IA: NONREACTIVE
IMM GRANULOCYTES # BLD AUTO: 0.03 X10(3)/MCL (ref 0–0.04)
IMM GRANULOCYTES NFR BLD AUTO: 0.3 %
LDLC SERPL CALC-MCNC: 86 MG/DL (ref 50–140)
LYMPHOCYTES # BLD AUTO: 2.59 X10(3)/MCL (ref 0.6–4.6)
LYMPHOCYTES NFR BLD AUTO: 26.3 %
MCH RBC QN AUTO: 30.6 PG (ref 27–31)
MCHC RBC AUTO-ENTMCNC: 33.1 G/DL (ref 33–36)
MCV RBC AUTO: 92.6 FL (ref 80–94)
MONOCYTES # BLD AUTO: 0.79 X10(3)/MCL (ref 0.1–1.3)
MONOCYTES NFR BLD AUTO: 8 %
NEUTROPHILS # BLD AUTO: 6.1 X10(3)/MCL (ref 2.1–9.2)
NEUTROPHILS NFR BLD AUTO: 62.2 %
NRBC BLD AUTO-RTO: 0 %
PLATELET # BLD AUTO: 213 X10(3)/MCL (ref 130–400)
PMV BLD AUTO: 10.3 FL (ref 7.4–10.4)
POTASSIUM SERPL-SCNC: 4.2 MMOL/L (ref 3.5–5.1)
PROT SERPL-MCNC: 6.3 GM/DL (ref 5.8–7.6)
RBC # BLD AUTO: 4.47 X10(6)/MCL (ref 4.2–5.4)
SODIUM SERPL-SCNC: 144 MMOL/L (ref 136–145)
TRIGL SERPL-MCNC: 54 MG/DL (ref 37–140)
TSH SERPL-ACNC: 0.83 UIU/ML (ref 0.35–4.94)
VLDLC SERPL CALC-MCNC: 11 MG/DL
WBC # BLD AUTO: 9.83 X10(3)/MCL (ref 4.5–11.5)

## 2024-12-13 PROCEDURE — 85025 COMPLETE CBC W/AUTO DIFF WBC: CPT

## 2024-12-13 PROCEDURE — 80053 COMPREHEN METABOLIC PANEL: CPT

## 2024-12-13 PROCEDURE — 84443 ASSAY THYROID STIM HORMONE: CPT

## 2024-12-13 PROCEDURE — 82306 VITAMIN D 25 HYDROXY: CPT

## 2024-12-13 PROCEDURE — 87389 HIV-1 AG W/HIV-1&-2 AB AG IA: CPT

## 2024-12-13 PROCEDURE — 80061 LIPID PANEL: CPT

## 2024-12-13 PROCEDURE — 86803 HEPATITIS C AB TEST: CPT

## 2024-12-13 PROCEDURE — 83036 HEMOGLOBIN GLYCOSYLATED A1C: CPT

## 2024-12-13 PROCEDURE — 36415 COLL VENOUS BLD VENIPUNCTURE: CPT

## 2024-12-13 NOTE — TELEPHONE ENCOUNTER
Pt notified Dr. Lu did not find it appropriate to order Estrogen levels since Dr. Ernesto Jung is following pt and prescribing estradiol and Prolia injections. Pt advised to call GYN office and explain she wants testing done while on the medication. Pt verbalized understanding.

## 2024-12-13 NOTE — TELEPHONE ENCOUNTER
----- Message from [facility] sent at 12/13/2024  8:03 AM CST -----  Regarding: orders  Who Called: Kat Frazier    What order is the patient requesting: Hoirmone levels   When does the expect the orders to be performed? [facility]        Preferred Method of Contact: Phone Call  Patient's Preferred Phone Number on File: [phone]   Best Call Back Number, if different:    Additional Information: stated that she is currently at the lab, stated that she called last week about having her hormone levels checked. Stated that it was not ordered. Viv from the lab stated that she would like a call to discuss taking extra blood to test when orders are put in. Please advise

## 2024-12-13 NOTE — TELEPHONE ENCOUNTER
Pt notified Dr. Lu doesn't normally order estrogen levels. Pt stated she is taking a hormone pill and wants to check her estrogen level. Extra blood was drawn at the lab and Fide at Ortho lab stated to call if orders are placed.

## 2024-12-13 NOTE — TELEPHONE ENCOUNTER
Patient following with Dr. Ernesto Jung with OB-GYN. Per my last office visit, she is taking Prolia and hormones per OB-GYN. She will need to reach out to OB-GYN regarding this.

## 2024-12-18 ENCOUNTER — OFFICE VISIT (OUTPATIENT)
Dept: FAMILY MEDICINE | Facility: CLINIC | Age: 64
End: 2024-12-18
Payer: MEDICARE

## 2024-12-18 VITALS
BODY MASS INDEX: 22.21 KG/M2 | SYSTOLIC BLOOD PRESSURE: 138 MMHG | DIASTOLIC BLOOD PRESSURE: 80 MMHG | OXYGEN SATURATION: 99 % | HEIGHT: 66 IN | RESPIRATION RATE: 16 BRPM | HEART RATE: 72 BPM | WEIGHT: 138.19 LBS | TEMPERATURE: 98 F

## 2024-12-18 DIAGNOSIS — M15.0 PRIMARY OSTEOARTHRITIS INVOLVING MULTIPLE JOINTS: Primary | ICD-10-CM

## 2024-12-18 DIAGNOSIS — K58.1 IRRITABLE BOWEL SYNDROME WITH CONSTIPATION: ICD-10-CM

## 2024-12-18 DIAGNOSIS — F41.1 GENERALIZED ANXIETY DISORDER: Chronic | ICD-10-CM

## 2024-12-18 DIAGNOSIS — E55.9 VITAMIN D DEFICIENCY: ICD-10-CM

## 2024-12-18 DIAGNOSIS — M81.0 AGE-RELATED OSTEOPOROSIS WITHOUT CURRENT PATHOLOGICAL FRACTURE: ICD-10-CM

## 2024-12-18 PROCEDURE — 99214 OFFICE O/P EST MOD 30 MIN: CPT | Mod: ,,, | Performed by: STUDENT IN AN ORGANIZED HEALTH CARE EDUCATION/TRAINING PROGRAM

## 2024-12-18 RX ORDER — MELOXICAM 15 MG/1
15 TABLET ORAL DAILY PRN
COMMUNITY
Start: 2024-09-26

## 2024-12-18 NOTE — ASSESSMENT & PLAN NOTE
- Stable.  - Patient was following with Dr. Abdi Platt with [facility] in [place], [place].   - She is also following with Dr. Pat Jacobs with Rheumatology.   - She is taking Baclofen and Mobic.

## 2024-12-18 NOTE — PROGRESS NOTES
Subjective:      Patient ID: Kat Frazier is a 64 y.o.  female.    Chief Complaint: Chronic Medical Management    Lab Results: CBC w/diff negative. CMP overall negative. Lipid panel negative. Vitamin D level negative. A1c negative. TSH negative. Hepatitis C Antibody negative. HIV negative.    Osteoarthritis: Patient was following with Dr. Abdi Platt with [facility] in [place], [place]. She is also following with Dr. Pat Jacobs with Rheumatology. She is taking Baclofen and Mobic.       Osteoporosis: Patient following with Dr. Ernesto Jung with OB-GYN. Patient taking Prolia and hormones per OB-GYN.     Anxiety: Patient taking Valium PRN.     Abdominal Bloating/Gas/Constipation: Patient taking Miralax daily, probiotics, and magnesium citrate PRN OTC. She is doing well on Linzess and will let us know what does she's on when she gets a chance.    Preventative Health: Medicare Wellness completed on 06/26/24.    Review of Systems   Constitutional:  Negative for activity change, appetite change, chills, diaphoresis, fatigue, fever and unexpected weight change.   Eyes:  Negative for visual disturbance.   Respiratory:  Negative for apnea, cough, shortness of breath, wheezing and stridor.    Cardiovascular:  Negative for chest pain, palpitations and leg swelling.   Gastrointestinal:  Positive for abdominal distention (bloating/gas) and constipation. Negative for abdominal pain, blood in stool, diarrhea, nausea and vomiting.   Genitourinary:  Negative for dysuria and hematuria.   Musculoskeletal:  Positive for arthralgias, back pain and neck pain. Negative for gait problem, joint swelling and myalgias.   Skin:  Negative for rash and wound.   Neurological:  Negative for dizziness, syncope, weakness, numbness and headaches.   Psychiatric/Behavioral:  Positive for sleep disturbance. Negative for agitation, behavioral problems, confusion, decreased concentration, dysphoric mood,  "hallucinations, self-injury and suicidal ideas. The patient is nervous/anxious. The patient is not hyperactive.      Objective:   /80 (BP Location: Right arm, Patient Position: Sitting)   Pulse 72   Temp 98.2 °F (36.8 °C) (Temporal)   Resp 16   Ht 5' 6" (1.676 m)   Wt 62.7 kg (138 lb 3.2 oz)   LMP  (LMP Unknown)   SpO2 99%   BMI 22.31 kg/m²     Physical Exam  Vitals and nursing note reviewed.   Constitutional:       General: She is not in acute distress.     Appearance: Normal appearance. She is not ill-appearing or toxic-appearing.   HENT:      Head: Normocephalic and atraumatic.   Eyes:      Conjunctiva/sclera: Conjunctivae normal.   Cardiovascular:      Rate and Rhythm: Normal rate and regular rhythm.      Heart sounds: Normal heart sounds. No murmur heard.  Pulmonary:      Effort: Pulmonary effort is normal. No respiratory distress.      Breath sounds: Normal breath sounds.   Abdominal:      General: There is no distension.      Palpations: Abdomen is soft. There is no mass.      Tenderness: There is no abdominal tenderness. There is no guarding.   Musculoskeletal:         General: No deformity.      Right lower leg: No edema.      Left lower leg: No edema.   Skin:     General: Skin is warm and dry.      Findings: No lesion or rash.   Neurological:      General: No focal deficit present.      Mental Status: She is alert. Mental status is at baseline.      Motor: No weakness.      Coordination: Coordination normal.   Psychiatric:         Mood and Affect: Mood normal.         Behavior: Behavior normal.         Thought Content: Thought content normal.         Judgment: Judgment normal.       Assessment/Plan:   1. Primary osteoarthritis involving multiple joints  Assessment & Plan:  - Stable.  - Patient was following with Dr. Abdi Platt with [facility] in [place], [place].   - She is also following with Dr. Pat Jacobs with Rheumatology.   - She is taking Baclofen and Mobic.        2. " Age-related osteoporosis without current pathological fracture  Assessment & Plan:  - Patient following with Dr. Ernesto Jung with OB-GYN.  - Patient taking Prolia and hormones per OB-GYN.      3. Vitamin D deficiency  Assessment & Plan:  - Vitamin D level negative.      4. Generalized anxiety disorder  Assessment & Plan:  - High-Risk Medication Agreement signed by patient on 01/03/24  - UDS UTD from 01/03/24.  - Continue Valium 5mg PRN.      5. Irritable bowel syndrome with constipation  Assessment & Plan:  - Stable.  - Continue Linzess.         Follow up in about 6 months (around 6/27/2025) for Medicare Wellness.

## 2024-12-26 ENCOUNTER — OFFICE VISIT (OUTPATIENT)
Dept: URGENT CARE | Facility: CLINIC | Age: 64
End: 2024-12-26
Payer: MEDICARE

## 2024-12-26 VITALS
SYSTOLIC BLOOD PRESSURE: 176 MMHG | HEART RATE: 76 BPM | DIASTOLIC BLOOD PRESSURE: 82 MMHG | BODY MASS INDEX: 22.18 KG/M2 | WEIGHT: 138 LBS | TEMPERATURE: 99 F | RESPIRATION RATE: 17 BRPM | OXYGEN SATURATION: 99 % | HEIGHT: 66 IN

## 2024-12-26 DIAGNOSIS — J02.9 SORE THROAT: Primary | ICD-10-CM

## 2024-12-26 DIAGNOSIS — R05.9 COUGH, UNSPECIFIED TYPE: ICD-10-CM

## 2024-12-26 LAB
CTP QC/QA: YES
HETEROPH AB SER QL: NEGATIVE
MOLECULAR STREP A: NEGATIVE
MYCOPLAS PCR (OHS): POSITIVE
POC MOLECULAR INFLUENZA A AGN: NEGATIVE
POC MOLECULAR INFLUENZA B AGN: NEGATIVE

## 2024-12-26 PROCEDURE — 99213 OFFICE O/P EST LOW 20 MIN: CPT | Mod: ,,, | Performed by: NUTRITIONIST

## 2024-12-26 PROCEDURE — 87581 M.PNEUMON DNA AMP PROBE: CPT | Performed by: NUTRITIONIST

## 2024-12-26 RX ORDER — AZELASTINE 1 MG/ML
1 SPRAY, METERED NASAL 2 TIMES DAILY
Qty: 30 ML | Refills: 0 | Status: SHIPPED | OUTPATIENT
Start: 2024-12-26 | End: 2025-12-26

## 2024-12-26 RX ORDER — PREDNISONE 20 MG/1
20 TABLET ORAL DAILY
Qty: 5 TABLET | Refills: 0 | Status: SHIPPED | OUTPATIENT
Start: 2024-12-26 | End: 2024-12-31

## 2024-12-26 NOTE — PROGRESS NOTES
"Subjective:      Patient ID: Kat Frazier is a 64 y.o. female.    Vitals:  height is 5' 6" (1.676 m) and weight is 62.6 kg (138 lb). Her temperature is 99.1 °F (37.3 °C). Her blood pressure is 176/82 (abnormal) and her pulse is 76. Her respiration is 17 and oxygen saturation is 99%.     Chief Complaint: Sore Throat and Rash     Patient is a 64 y.o. female who presents to urgent care with complaints of sore throat, congestion, ulcers in mouth x yesterday, rash to face x this morning  . Alleviating factors include none.       HENT:  Positive for mouth sores (Canker sores that started yesterday inside mouth.). Negative for ear discharge, drooling, facial swelling and trouble swallowing.    Neck: Negative for neck pain and neck stiffness.   Cardiovascular:  Negative for chest pain.   Respiratory:  Negative for bloody sputum, shortness of breath and wheezing.    Gastrointestinal:  Negative for abdominal pain, vomiting and diarrhea.   Skin:  Negative for rash (Rash to face that started this morning, located in between the eyes, temporal region that she describes as itchy.).      Objective:        Previous History      Review of patient's allergies indicates:   Allergen Reactions    Sulfa (sulfonamide antibiotics) Itching       Past Medical History:   Diagnosis Date    Anxiety     Hypertension      Current Outpatient Medications   Medication Instructions    (Magic mouthwash) 1:1:1 diphenhydrAMINE(Benadryl) 12.5mg/5ml liq, aluminum & magnesium hydroxide-simethicone (Maalox), LIDOcaine viscous 2% 5 mLs, Swish & Spit, Every 4 hours PRN, for mouth sores    azelastine (ASTELIN) 137 mcg, Nasal, 2 times daily    baclofen (LIORESAL) 10-20 mg, Nightly    denosumab (PROLIA SUBQ) Every 6 months    diazePAM (VALIUM) 5 mg, Oral, Daily PRN    fluticasone propionate (FLONASE) 50 mcg, Each Nostril, Daily    meloxicam (MOBIC) 15 mg, Daily PRN    norethindrone-ethinyl estradiol (FEMHRT 1/5) 1-5 mg-mcg Tab 1 tablet, Daily    " "predniSONE (DELTASONE) 20 mg, Oral, Daily     Past Surgical History:   Procedure Laterality Date    AUGMENTATION OF BREAST      BACK SURGERY  04/28/2023     Family History   Problem Relation Name Age of Onset    Hypertension Mother      Heart disease Father      Stroke Father      Heart attack Father         Social History     Tobacco Use    Smoking status: Never    Smokeless tobacco: Never   Substance Use Topics    Alcohol use: Yes     Comment: Occasionally     Drug use: Never        Physical Exam      Vital Signs Reviewed   BP (!) 176/82   Pulse 76   Temp 99.1 °F (37.3 °C)   Resp 17   Ht 5' 6" (1.676 m)   Wt 62.6 kg (138 lb)   LMP  (LMP Unknown)   SpO2 99%   BMI 22.27 kg/m²        Procedures    Procedures     Labs     Results for orders placed or performed in visit on 12/26/24   POCT Strep A, Molecular    Collection Time: 12/26/24 12:26 PM   Result Value Ref Range    Molecular Strep A, POC Negative Negative     Acceptable Yes    POCT Infectious mononucleosis antibody    Collection Time: 12/26/24 12:53 PM   Result Value Ref Range    Monospot Negative Negative     Acceptable Yes    POCT Influenza A/B Molecular    Collection Time: 12/26/24  1:11 PM   Result Value Ref Range    POC Molecular Influenza A Ag Negative Negative    POC Molecular Influenza B Ag Negative Negative     Acceptable Yes        Physical Exam   Constitutional: She appears well-developed.  Non-toxic appearance. She does not appear ill. No distress.   HENT:   Head: Atraumatic.   Ears:   Right Ear: Tympanic membrane, external ear and ear canal normal.   Left Ear: Tympanic membrane, external ear and ear canal normal.   Nose: Congestion present. No purulent discharge. Right sinus exhibits no maxillary sinus tenderness and no frontal sinus tenderness. Left sinus exhibits no maxillary sinus tenderness and no frontal sinus tenderness.   Mouth/Throat: Uvula is midline. Mucous membranes are moist. Posterior " oropharyngeal erythema present.      Comments: Upon visual exam of inside mouth There are about 3 canker sores noticed throughout soft tissues of mouth (site of cheek, inner lip), floor of mouth.  Eyes: Right eye exhibits no discharge. Left eye exhibits no discharge. Extraocular movement intact   Neck: Neck supple. No neck rigidity present.   Cardiovascular: Normal rate, regular rhythm, normal heart sounds and normal pulses.   Pulmonary/Chest: Effort normal and breath sounds normal. No respiratory distress. She has no wheezes. She has no rhonchi. She has no rales.   Musculoskeletal: Normal range of motion.         General: Normal range of motion.   Lymphadenopathy:     She has no cervical adenopathy.   Neurological: She is alert.   Skin: Skin is warm, dry, not diaphoretic and rash. Capillary refill takes less than 2 seconds.         Comments: Patient has some redness , discoloration bilateral temporal region, in between eyes, and diffused rash above eyebrows.  Patient states that it itches.  This rash is not oozing, it is not scabbed, the rash looks more like a rosacea of some sort.   Psychiatric: Her behavior is normal.   Nursing note and vitals reviewed.      Assessment:     1. Sore throat    2. Cough, unspecified type        Plan:   Sore throat    Strep test:  Negative  Mono spot test:  Negative  Flu test:  Negative        I will be treating you for an upper respiratory infection.  Usually URIs are viral in etiology, so we will be treating your symptoms at this time.  Will also be swabbing you for mycoplasma stay.  As discussed mycoplasma is a bacteria that if not treated properly could cause a pneumonia.  Results will be pending, we will contact you when final report comes in and will treat accordingly if needed.  If mycoplasma test comes back positive we will send an order for antibiotic to your pharmacy.    I will be sending a magic mouthwash for you for your and canker sores.  You will be prescribed an oral  steroid for rash      Medication will be sent to pharmacy.    Drink plenty of fluids.     Get plenty of rest.     Tylenol or Motrin as needed.     You may start an over-the-counter antihistamines such as Zyrtec, Claritin or Allegra.    If symptoms persist the in the next 7-10 days please return to clinic.  Go to the ER with any significant change or worsening of symptoms.     Addendum:  12/27/2024 final report from mycoplasma swab was positive.  I will be prescribing azithromycin at this time to cover for mycoplasma infection.  Patient was contacted and a voicemail was left for her to contact the clinic.    Sore throat  -     POCT Strep A, Molecular  -     POCT Infectious mononucleosis antibody  -     POCT Influenza A/B Molecular  -     MYCOPLASMA BY PCR; Future; Expected date: 12/26/2024    Cough, unspecified type  -     MYCOPLASMA BY PCR; Future; Expected date: 12/26/2024    Other orders  -     (Magic mouthwash) 1:1:1 diphenhydrAMINE(Benadryl) 12.5mg/5ml liq, aluminum & magnesium hydroxide-simethicone (Maalox), LIDOcaine viscous 2%; Swish and spit 5 mLs every 4 (four) hours as needed (gargle and spit for sore throat). for mouth sores  Dispense: 360 mL; Refill: 0  -     predniSONE (DELTASONE) 20 MG tablet; Take 1 tablet (20 mg total) by mouth once daily. for 5 days  Dispense: 5 tablet; Refill: 0  -     azelastine (ASTELIN) 137 mcg (0.1 %) nasal spray; 1 spray (137 mcg total) by Nasal route 2 (two) times daily.  Dispense: 30 mL; Refill: 0

## 2024-12-26 NOTE — PATIENT INSTRUCTIONS
Sore throat    Strep test:  Negative  Mono spot test:  Negative  Flu test:  Negative        I will be treating you for an upper respiratory infection.  Usually URIs are viral in etiology, so we will be treating your symptoms at this time.  Will also be swabbing you for mycoplasma stay.  As discussed mycoplasma is a bacteria that if not treated properly could cause a pneumonia.  Results will be pending, we will contact you when final report comes in and will treat accordingly if needed.  If mycoplasma test comes back positive we will send an order for antibiotic to your pharmacy.    I will be sending a magic mouthwash for you for your and canker sores.  You will be prescribed an oral steroid for rash      Medication will be sent to pharmacy.    Drink plenty of fluids.     Get plenty of rest.     Tylenol or Motrin as needed.     You may start an over-the-counter antihistamines such as Zyrtec, Claritin or Allegra.    If symptoms persist the in the next 7-10 days please return to clinic.  Go to the ER with any significant change or worsening of symptoms.     Follow up with your primary care doctor.

## 2024-12-27 RX ORDER — AZITHROMYCIN 250 MG/1
TABLET, FILM COATED ORAL
Qty: 6 TABLET | Refills: 0 | Status: SHIPPED | OUTPATIENT
Start: 2024-12-27

## 2024-12-29 ENCOUNTER — TELEPHONE (OUTPATIENT)
Dept: URGENT CARE | Facility: CLINIC | Age: 64
End: 2024-12-29
Payer: MEDICARE

## 2024-12-29 NOTE — TELEPHONE ENCOUNTER
I attempted to contact patient to discuss her recent final mycoplasma result (see provider's note on that lab result). No answer, VM left instructing patient to call the clinic.

## 2025-01-04 ENCOUNTER — TELEPHONE (OUTPATIENT)
Dept: URGENT CARE | Facility: CLINIC | Age: 65
End: 2025-01-04
Payer: MEDICARE

## 2025-04-02 DIAGNOSIS — F41.1 GENERALIZED ANXIETY DISORDER: Primary | Chronic | ICD-10-CM

## 2025-04-02 RX ORDER — DIAZEPAM 5 MG/1
5 TABLET ORAL DAILY PRN
Qty: 30 TABLET | Refills: 0 | Status: SHIPPED | OUTPATIENT
Start: 2025-04-02

## 2025-04-02 NOTE — TELEPHONE ENCOUNTER
Copied from CRM #[id number]. Topic: Appointments - Appointment Scheduling  >> Apr 1, 2025  3:18 PM Odilon wrote:  Who Called: Kat Frazier    Refill or New Rx:Refill  RX Name and Strength:diazePAM (VALIUM) 5 MG tablet  How is the patient currently taking it? (ex. 1XDay):  Is this a 30 day or 90 day RX:  Local or Mail Order:  List of preferred pharmacies on file (remove unneeded): [unfilled]  If different Pharmacy is requested, enter Pharmacy information here including location and phone number: [facility] -[address] [facility]   Ordering Provider:    Preferred Method of Contact: Phone Call  Patient's Preferred Phone Number on File: [phone]   Best Call Back Number, if different:    Additional Information: Please send to above pharmacy

## 2025-04-10 ENCOUNTER — TELEPHONE (OUTPATIENT)
Dept: FAMILY MEDICINE | Facility: CLINIC | Age: 65
End: 2025-04-10
Payer: MEDICARE

## 2025-04-16 ENCOUNTER — CLINICAL SUPPORT (OUTPATIENT)
Dept: RESPIRATORY THERAPY | Facility: HOSPITAL | Age: 65
End: 2025-04-16
Attending: NURSE PRACTITIONER
Payer: MEDICARE

## 2025-04-16 ENCOUNTER — OFFICE VISIT (OUTPATIENT)
Dept: FAMILY MEDICINE | Facility: CLINIC | Age: 65
End: 2025-04-16
Payer: MEDICARE

## 2025-04-16 ENCOUNTER — RESULTS FOLLOW-UP (OUTPATIENT)
Dept: FAMILY MEDICINE | Facility: CLINIC | Age: 65
End: 2025-04-16

## 2025-04-16 ENCOUNTER — HOSPITAL ENCOUNTER (OUTPATIENT)
Dept: RADIOLOGY | Facility: HOSPITAL | Age: 65
Discharge: HOME | End: 2025-04-16
Attending: NURSE PRACTITIONER
Payer: MEDICARE

## 2025-04-16 VITALS
OXYGEN SATURATION: 99 % | BODY MASS INDEX: 21.64 KG/M2 | WEIGHT: 134.69 LBS | RESPIRATION RATE: 19 BRPM | TEMPERATURE: 98 F | SYSTOLIC BLOOD PRESSURE: 106 MMHG | HEIGHT: 66 IN | HEART RATE: 69 BPM | DIASTOLIC BLOOD PRESSURE: 68 MMHG

## 2025-04-16 DIAGNOSIS — Z01.818 PREOP TESTING: Primary | ICD-10-CM

## 2025-04-16 DIAGNOSIS — Z01.818 PREOP TESTING: ICD-10-CM

## 2025-04-16 DIAGNOSIS — R79.1 ABNORMAL COAGULATION PROFILE: ICD-10-CM

## 2025-04-16 DIAGNOSIS — Z01.818 PRE-OP EXAM: Primary | ICD-10-CM

## 2025-04-16 DIAGNOSIS — Z01.818 PREOP EXAMINATION: Primary | ICD-10-CM

## 2025-04-16 PROCEDURE — 93005 ELECTROCARDIOGRAM TRACING: CPT

## 2025-04-16 PROCEDURE — 99213 OFFICE O/P EST LOW 20 MIN: CPT | Mod: ,,, | Performed by: NURSE PRACTITIONER

## 2025-04-16 PROCEDURE — 71046 X-RAY EXAM CHEST 2 VIEWS: CPT | Mod: TC

## 2025-04-16 PROCEDURE — 93010 ELECTROCARDIOGRAM REPORT: CPT | Mod: ,,, | Performed by: INTERNAL MEDICINE

## 2025-04-16 NOTE — PROGRESS NOTES
Subjective:      Patient ID: Kat Frazier is a 64 y.o. White female      Chief Complaint: Sx Clearance       Past Medical History:   Diagnosis Date    Anxiety     Generalized anxiety disorder 08/18/2022    Hypertension     Irritable bowel syndrome with constipation 06/26/2024    OP (osteoporosis) 10/21/2022    Primary osteoarthritis involving multiple joints 01/03/2024    Vitamin D deficiency 03/22/2024        HPI  History of Present Illness      Presents to clinic for surgery clearance.      Ms. Frazier has a history of back problems, with previous L3-L4 fusion approximately 2 years ago (April 28, 2021). This surgery initially provided significant pain relief, but after about 2 years, pain recurred. The current pain is localized differently from previous symptoms. While the first surgery addressed pain down her back and the back of her leg, the current pain is in her upper back and extends down to her knee. She also reports numbness in her left upper thigh.    Her surgeon, Dr. Abdi Caballero, noted a rapid progression of her condition, with a minor depression in the L2-L3 area that was not initially considered severe enough for surgery. Her discs are reportedly sliding back and forth, causing instability and leading to cyst formation. These cysts, along with disc bulging, are pressing on her nerves.    Prior to deciding on surgery, she tried conservative treatments including physical therapy for about 6 weeks to strengthen her muscles, which was ineffective. She also received an epidural injection. As her pain persisted, she decided to proceed with surgery.    For pain management, she uses a heating pad while sleeping and occasionally takes Valium when the pain becomes severe. She has tried pain patches for travel but primarily relies on the heating pad for nighttime pain relief.    Her surgery is scheduled for next Friday, April 25th.      She denies any heart problems, chest pain, chest tightness, chest  heaviness, irregular heartbeat, valvular heart disease, uncontrolled high blood pressure, heart attack, abnormal EKG, congestive heart failure, shortness of breath with activity, or swelling in the legs. She also denies having diabetes or kidney problems.    MEDICATIONS:  Ms. Frazier has been prescribed Meloxicam but has not been taking it recently and was instructed not to take it this week.     TEST RESULTS:  Ms. Frazier's white blood cell count was 9.83 four months ago, which was normal. Her current white blood cell count is elevated at 12.42. Her current INR is 1.2. Her current liver function tests, kidney function tests, and urinalysis are all normal. Ms. Frazier's current EKG shows a low heart rate (sinus bradycardia) and possible left atrial enlargement. The results are borderline abnormal but there are no acute concerns.      IMAGING:  Ms. Reynolds current chest XR is normal.        METS Score > 4: she is able to perform aerobic exercise 45 minutes twice a week without stopping; also able to climb one flight of stairs without having to rest     EKG performed today: yes  Preoperative Labs ordered: yes  Medications adjusted: Hold NSAIDS 10 DAYS PRIOR TO SURGERY           Patient Care Team:  Beata Lu DO as PCP - General (Family Medicine)  Jackson Steele Jr., MD as Consulting Physician (Orthopedic Surgery)  Darryn Jung MD as Consulting Physician (Obstetrics and Gynecology)  Abdi Platt MD as Consulting Physician (Spine Surgery)    Review of Systems   Constitutional: Negative.  Negative for appetite change, chills and fever.   HENT: Negative.     Eyes: Negative.  Negative for discharge and redness.   Respiratory: Negative.  Negative for shortness of breath.    Cardiovascular: Negative.  Negative for chest pain.   Gastrointestinal: Negative.    Endocrine: Negative.    Genitourinary: Negative.    Integumentary:  Negative.   Allergic/Immunologic: Negative.    Neurological: Negative.     Psychiatric/Behavioral: Negative.     All other systems reviewed and are negative.        Objective:      Vitals:    04/16/25 1305   BP: 106/68   Pulse: 69   Resp: 19   Temp: 98.1 °F (36.7 °C)      Body mass index is 21.74 kg/m².     Physical Exam  Vitals reviewed.   Constitutional:       Appearance: Normal appearance.   HENT:      Head: Normocephalic.      Mouth/Throat:      Mouth: Mucous membranes are moist.   Eyes:      Conjunctiva/sclera: Conjunctivae normal.      Pupils: Pupils are equal, round, and reactive to light.   Cardiovascular:      Rate and Rhythm: Normal rate and regular rhythm.   Pulmonary:      Effort: Pulmonary effort is normal. No respiratory distress.      Breath sounds: Normal breath sounds.   Abdominal:      General: Bowel sounds are normal. There is no distension.      Palpations: Abdomen is soft.      Tenderness: There is no abdominal tenderness.   Musculoskeletal:         General: Normal range of motion.      Cervical back: Normal range of motion and neck supple.   Skin:     General: Skin is warm and dry.   Neurological:      Mental Status: She is alert and oriented to person, place, and time.   Psychiatric:         Mood and Affect: Mood normal.          Current Medications[1]    Assessment & Plan:   Assessment & Plan      PRE-OP CLEARANCE   -Scheduled for L2-L3 and L3-L4 fusion surgery for next Friday, following the patient's previous L3-L4 fusion surgery approximately 2 years ago, which was extremely helpful in reducing pain.  - Noted previous ineffective treatments including 6 weeks of physical therapy and an epidural.  - Discontinued meloxicam for at least 10 days prior to surgery.  - Reviewed lab results, noting elevated WBC count of 12.42.  - Considered possibility of acute infection or inflammation, given normal WBC count 4 months prior (9.83). (Pt has no overt signs of infection)  - Determined need to repeat WBC count before medical clearance for scheduled back surgery.  - Assessed  EKG results, noting sinus bradycardia and possible left atrial enlargement, but no acute concerns; pt denies any CP and states negative Cardiac evaluation in the past  - Evaluated chest XR, found to be normal.  - Plan to consult with surgeon (Dr. Caballero) if WBC remains elevated on repeat testing.         Problem List Items Addressed This Visit          Other    Pre-op exam - Primary    Relevant Orders    CBC Auto Differential         This note was generated with the assistance of ambient listening technology. Verbal consent was obtained by the patient and accompanying visitor(s) for the recording of patient appointment to facilitate this note. I attest to having reviewed and edited the generated note for accuracy, though some syntax or spelling errors may persist. Please contact the author of this note for any clarification.    Total time (25 minutes).This includes face to face time and non-face to face time preparing to see the patient (eg, review of tests), obtaining and/or reviewing separately obtained history, documenting clinical information in the electronic or other health record, independently interpreting results and communicating results to the patient/family/caregiver, or care coordinator.              [1]   Current Outpatient Medications:     azelastine (ASTELIN) 137 mcg (0.1 %) nasal spray, 1 spray (137 mcg total) by Nasal route 2 (two) times daily., Disp: 30 mL, Rfl: 0    baclofen (LIORESAL) 10 MG tablet, Take 10-20 mg by mouth every evening., Disp: , Rfl:     denosumab (PROLIA SUBQ), Inject into the skin every 6 (six) months., Disp: , Rfl:     diazePAM (VALIUM) 5 MG tablet, Take 1 tablet (5 mg total) by mouth daily as needed for Anxiety., Disp: 30 tablet, Rfl: 0    fluticasone propionate (FLONASE) 50 mcg/actuation nasal spray, 1 spray (50 mcg total) by Each Nostril route once daily., Disp: 16 g, Rfl: 11    meloxicam (MOBIC) 15 MG tablet, Take 15 mg by mouth daily as needed., Disp: , Rfl:      norethindrone-ethinyl estradiol (FEMHRT 1/5) 1-5 mg-mcg Tab, Take 1 tablet by mouth once daily., Disp: , Rfl:

## 2025-04-17 ENCOUNTER — TELEPHONE (OUTPATIENT)
Dept: FAMILY MEDICINE | Facility: CLINIC | Age: 65
End: 2025-04-17
Payer: MEDICARE

## 2025-04-17 LAB
OHS QRS DURATION: 74 MS
OHS QTC CALCULATION: 426 MS

## 2025-04-17 NOTE — TELEPHONE ENCOUNTER
Patient called office.  I spoke with patient.    States she forgot to mention, she is on steroid therapy, which can increase her WBC count.  Her last dose of prednisone will be on 4/19/2025.    Pt did not have any overt signs of infection.    She has anxiety about repeating labs and does not wish to undergo another needle stick.      I put in a call to Dr. Abdi Platt's clinical staff to return my call so that I may relay results to him, prior to surgery.       Please fax Labs, EKG, and CXR to Dr. Abdi Platt in [place] for his review prior to surgery.      Thanks.

## 2025-04-22 ENCOUNTER — TELEPHONE (OUTPATIENT)
Dept: FAMILY MEDICINE | Facility: CLINIC | Age: 65
End: 2025-04-22
Payer: MEDICARE

## 2025-05-01 ENCOUNTER — TELEPHONE (OUTPATIENT)
Dept: ADMINISTRATIVE | Facility: CLINIC | Age: 65
End: 2025-05-01
Payer: MEDICARE

## 2025-05-22 ENCOUNTER — TELEPHONE (OUTPATIENT)
Dept: ADMINISTRATIVE | Facility: CLINIC | Age: 65
End: 2025-05-22
Payer: MEDICARE

## 2025-05-26 ENCOUNTER — INFUSION (OUTPATIENT)
Dept: INFUSION THERAPY | Facility: HOSPITAL | Age: 65
End: 2025-05-26
Attending: OBSTETRICS & GYNECOLOGY
Payer: MEDICARE

## 2025-05-26 VITALS
HEART RATE: 62 BPM | TEMPERATURE: 98 F | OXYGEN SATURATION: 100 % | SYSTOLIC BLOOD PRESSURE: 148 MMHG | DIASTOLIC BLOOD PRESSURE: 81 MMHG | RESPIRATION RATE: 18 BRPM

## 2025-05-26 DIAGNOSIS — M81.0 OSTEOPOROSIS, UNSPECIFIED OSTEOPOROSIS TYPE, UNSPECIFIED PATHOLOGICAL FRACTURE PRESENCE: Primary | ICD-10-CM

## 2025-05-26 PROCEDURE — 96372 THER/PROPH/DIAG INJ SC/IM: CPT

## 2025-05-26 PROCEDURE — 63600175 PHARM REV CODE 636 W HCPCS: Mod: JZ,TB | Performed by: STUDENT IN AN ORGANIZED HEALTH CARE EDUCATION/TRAINING PROGRAM

## 2025-05-26 RX ADMIN — DENOSUMAB 60 MG: 60 INJECTION SUBCUTANEOUS at 02:05

## 2025-05-26 NOTE — PLAN OF CARE
Prolia injection given (Q6M); tolerated well; next appointments discussed with patient; discharged home in stable condition.     
[FreeTextEntry1] : Patient with known hx of POP managed with pessary RS#4 presents to office for follow up. Patient states she is happy with the pessary. Denies urinary or bowel complaints. Denies pelvic pain, pelvic pressure or vaginal bleeding. She is using Replens.

## 2025-06-03 ENCOUNTER — TELEPHONE (OUTPATIENT)
Dept: FAMILY MEDICINE | Facility: CLINIC | Age: 65
End: 2025-06-03
Payer: MEDICARE

## 2025-06-06 ENCOUNTER — HOSPITAL ENCOUNTER (OUTPATIENT)
Dept: RADIOLOGY | Facility: HOSPITAL | Age: 65
Discharge: HOME | End: 2025-06-06
Attending: ORTHOPAEDIC SURGERY
Payer: MEDICARE

## 2025-06-06 DIAGNOSIS — M54.10 RADICULOPATHY: ICD-10-CM

## 2025-06-06 PROCEDURE — 72100 X-RAY EXAM L-S SPINE 2/3 VWS: CPT | Mod: TC

## 2025-06-30 ENCOUNTER — OFFICE VISIT (OUTPATIENT)
Dept: FAMILY MEDICINE | Facility: CLINIC | Age: 65
End: 2025-06-30
Payer: MEDICARE

## 2025-06-30 VITALS
WEIGHT: 142.88 LBS | HEIGHT: 66 IN | TEMPERATURE: 99 F | SYSTOLIC BLOOD PRESSURE: 132 MMHG | RESPIRATION RATE: 18 BRPM | HEART RATE: 62 BPM | OXYGEN SATURATION: 99 % | DIASTOLIC BLOOD PRESSURE: 86 MMHG | BODY MASS INDEX: 22.96 KG/M2

## 2025-06-30 DIAGNOSIS — F41.1 GENERALIZED ANXIETY DISORDER: Chronic | ICD-10-CM

## 2025-06-30 DIAGNOSIS — Z00.00 MEDICARE ANNUAL WELLNESS VISIT, SUBSEQUENT: Primary | ICD-10-CM

## 2025-06-30 DIAGNOSIS — M81.0 AGE-RELATED OSTEOPOROSIS WITHOUT CURRENT PATHOLOGICAL FRACTURE: ICD-10-CM

## 2025-06-30 DIAGNOSIS — M15.0 PRIMARY OSTEOARTHRITIS INVOLVING MULTIPLE JOINTS: ICD-10-CM

## 2025-06-30 DIAGNOSIS — K58.1 IRRITABLE BOWEL SYNDROME WITH CONSTIPATION: ICD-10-CM

## 2025-06-30 PROBLEM — Z01.818 PRE-OP EXAM: Status: RESOLVED | Noted: 2025-04-16 | Resolved: 2025-06-30

## 2025-06-30 RX ORDER — CYCLOBENZAPRINE HCL 10 MG
10 TABLET ORAL EVERY 8 HOURS PRN
COMMUNITY
Start: 2025-04-27 | End: 2025-06-30

## 2025-06-30 RX ORDER — BACLOFEN 10 MG/1
10-20 TABLET ORAL NIGHTLY
Qty: 180 TABLET | Refills: 0 | Status: SHIPPED | OUTPATIENT
Start: 2025-06-30

## 2025-06-30 RX ORDER — TRAZODONE HYDROCHLORIDE 50 MG/1
50 TABLET ORAL NIGHTLY
COMMUNITY
Start: 2025-04-27 | End: 2025-06-30

## 2025-06-30 RX ORDER — DIAZEPAM 5 MG/1
5 TABLET ORAL DAILY PRN
Qty: 30 TABLET | Refills: 0 | Status: SHIPPED | OUTPATIENT
Start: 2025-06-30

## 2025-06-30 NOTE — ASSESSMENT & PLAN NOTE
- Stable.  - Patient was following with Dr. Abdi Platt with [facility] in [place], [place].   - She is also following with Dr. Pat Jacobs with Rheumatology.   - She is taking Baclofen.

## 2025-06-30 NOTE — PROGRESS NOTES
Family Medicine      Patient ID: [id number]     Chief Complaint: Medicare Annual Wellness     HPI:     Kat Frazier is a 64 y.o.  female here today for a Medicare Annual Wellness visit and comprehensive Health Risk Assessment.     Osteoarthritis: Patient was following with Dr. Abdi Platt with [facility] in [place], [place]. She is also following with Dr. Pat Jacobs with Rheumatology. She is taking Baclofen.     Osteoporosis: Patient following with Dr. Ernesto Jung with OB-GYN. Patient taking Prolia and hormones per OB-GYN.     Anxiety: Patient taking Valium PRN.     Abdominal Bloating/Gas/Constipation: Patient taking Miralax daily, probiotics, and magnesium citrate PRN OTC.    Health Maintenance         Date Due Completion Date    TETANUS VACCINE Never done ---    COVID-19 Vaccine (5 - 2024-25 season) 09/01/2024 4/10/2021    Pneumococcal Vaccines (Age 50+) (1 of 1 - PCV) 06/30/2026 (Originally 10/7/2008) ---    Influenza Vaccine (Season Ended) 09/01/2025 2/1/2024 (Declined)    Override on 2/1/2024: Declined    Mammogram 10/26/2025 10/26/2024    DEXA Scan 10/24/2026 10/24/2024    Lipid Panel 12/13/2029 12/13/2024    Colorectal Cancer Screening 09/25/2033 9/25/2023    RSV Vaccine (Age 60+ and Pregnant patients) (1 - 1-dose 75+ series) 10/07/2033 ---             Past Medical History:   Diagnosis Date    Anxiety     Generalized anxiety disorder 08/18/2022    Hypertension     Irritable bowel syndrome with constipation 06/26/2024    OP (osteoporosis) 10/21/2022    Primary osteoarthritis involving multiple joints 01/03/2024    Vitamin D deficiency 03/22/2024        Past Surgical History:   Procedure Laterality Date    AUGMENTATION OF BREAST      BACK SURGERY  04/28/2023        Social History     Socioeconomic History    Marital status:     Number of children: 5   Occupational History    Occupation: Unemployed    Tobacco Use    Smoking status: Never    Smokeless tobacco: Never    Substance and Sexual Activity    Alcohol use: Yes     Comment: Occasionally     Drug use: Never    Sexual activity: Not Currently        Family History   Problem Relation Name Age of Onset    Hypertension Mother      Heart disease Father      Stroke Father      Heart attack Father          Current Outpatient Medications   Medication Instructions    azelastine (ASTELIN) 137 mcg, Nasal, 2 times daily    baclofen (LIORESAL) 10-20 mg, Nightly    denosumab (PROLIA SUBQ) Every 6 months    diazePAM (VALIUM) 5 mg, Oral, Daily PRN    fluticasone propionate (FLONASE) 50 mcg, Each Nostril, Daily    norethindrone-ethinyl estradiol (FEMHRT 1/5) 1-5 mg-mcg Tab 1 tablet, Daily       Review of patient's allergies indicates:   Allergen Reactions    Sulfa (sulfonamide antibiotics) Itching        Immunization History   Administered Date(s) Administered    COVID-19, MRNA, LN-S, PF (Pfizer) (Purple Cap) 04/01/2020, 05/01/2020, 03/18/2021, 04/10/2021    Influenza - Quadrivalent 01/01/2023    Influenza - Quadrivalent - PF *Preferred* (6 months and older) 10/10/2022    Zoster Recombinant 03/22/2024, 05/24/2024        Patient Care Team:  Beata Lu DO as PCP - General (Family Medicine)  Jackson Steele Jr., MD as Consulting Physician (Orthopedic Surgery)  Darryn Jung MD as Consulting Physician (Obstetrics and Gynecology)  Abdi Platt MD as Consulting Physician (Spine Surgery)    Subjective:     Review of Systems   Constitutional:  Negative for activity change, appetite change, chills, diaphoresis, fatigue, fever and unexpected weight change.   Eyes:  Negative for visual disturbance.   Respiratory:  Negative for apnea, cough, shortness of breath, wheezing and stridor.    Cardiovascular:  Negative for chest pain, palpitations and leg swelling.   Gastrointestinal:  Positive for abdominal distention (bloating/gas) and constipation. Negative for abdominal pain, blood in stool, diarrhea, nausea and vomiting.  "  Genitourinary:  Negative for dysuria and hematuria.   Musculoskeletal:  Positive for arthralgias, back pain and neck pain. Negative for gait problem, joint swelling and myalgias.   Skin:  Negative for rash and wound.   Neurological:  Negative for dizziness, syncope, weakness, numbness and headaches.   Psychiatric/Behavioral:  Positive for sleep disturbance. Negative for agitation, behavioral problems, confusion, decreased concentration, dysphoric mood, hallucinations, self-injury and suicidal ideas. The patient is nervous/anxious. The patient is not hyperactive.      Objective:     Visit Vitals  /86 (BP Location: Left arm, Patient Position: Sitting)   Pulse 62   Temp 98.5 °F (36.9 °C) (Oral)   Resp 18   Ht 5' 6" (1.676 m)   Wt 64.8 kg (142 lb 14.4 oz)   LMP  (LMP Unknown)   SpO2 99%   BMI 23.06 kg/m²       Physical Exam  Vitals and nursing note reviewed.   Constitutional:       General: She is not in acute distress.     Appearance: Normal appearance. She is not ill-appearing or toxic-appearing.   HENT:      Head: Normocephalic and atraumatic.      Mouth/Throat:      Mouth: Mucous membranes are moist.      Pharynx: Oropharynx is clear.   Eyes:      Conjunctiva/sclera: Conjunctivae normal.   Cardiovascular:      Rate and Rhythm: Normal rate and regular rhythm.      Heart sounds: Normal heart sounds. No murmur heard.  Pulmonary:      Effort: Pulmonary effort is normal. No respiratory distress.      Breath sounds: Normal breath sounds.   Abdominal:      General: There is no distension.      Palpations: Abdomen is soft. There is no mass.      Tenderness: There is no abdominal tenderness. There is no guarding.   Musculoskeletal:         General: No deformity.      Cervical back: Neck supple. No tenderness.      Right lower leg: No edema.      Left lower leg: No edema.   Lymphadenopathy:      Cervical: No cervical adenopathy.   Skin:     General: Skin is warm and dry.      Findings: No lesion or rash. "   Neurological:      General: No focal deficit present.      Mental Status: She is alert. Mental status is at baseline.      Motor: No weakness.      Coordination: Coordination normal.   Psychiatric:         Mood and Affect: Mood normal.         Behavior: Behavior normal.         Thought Content: Thought content normal.         Judgment: Judgment normal.         Assessment:       ICD-10-CM ICD-9-CM   1. Medicare annual wellness visit, subsequent  Z00.00 V70.0   2. Primary osteoarthritis involving multiple joints  M15.0 715.98   3. Age-related osteoporosis without current pathological fracture  M81.0 733.01   4. Generalized anxiety disorder  F41.1 300.02   5. Irritable bowel syndrome with constipation  K58.1 564.1        Plan:     1. Medicare annual wellness visit, subsequent  Comments:  - Health maintenance reviewed.    2. Primary osteoarthritis involving multiple joints  Assessment & Plan:  - Stable.  - Patient was following with Dr. Abdi Platt with [facility] in [place], [place].   - She is also following with Dr. Pat Jacobs with Rheumatology.   - She is taking Baclofen.      3. Age-related osteoporosis without current pathological fracture  Assessment & Plan:  - Patient following with Dr. Ernesto Jung with OB-GYN.  - Patient taking Prolia and hormones per OB-GYN.      4. Generalized anxiety disorder  Assessment & Plan:  - High-Risk Medication Agreement signed by patient on 01/03/24  - UDS UTD from 01/03/24.  - Continue Valium 5mg PRN.      5. Irritable bowel syndrome with constipation  Assessment & Plan:  - Stable.  - Continue Miralax OTC.           A comprehensive HEALTH RISK ASSESSMENT was completed today. Results are summarized below:    There are NO EMOTIONAL/SOCIAL CONCERNS identified on today's screening for Social Isolation, Depression and Anxiety.    There are NO COGNITIVE FUNCTION CONCERNS identified on today's screening.  There are NO FUNCTIONAL OR SAFETY CONCERNS were identified  on today's screening for Physical Symptoms, Nutritional, Cognitive Function, Home Safety/Living Situation, Fall Risk, Activities of Daily Living, Independent Activities of Daily Living, Physical Activity, Timed Up and Go test and Whisper test.   The patient reports NO OPIOID PRESCRIPTIONS. This was confirmed through medication reconciliation.    The patient is NOT A TOBACCO USER.        All Questions regarding food, transportation or housing were not answered today.    The patient was asked and declined the use of a free .    Advance Care Planning     Date: 06/30/2025    ACP Reviewed/No Changes  Voluntary advance care planning discussion had today with patient. Education/information provided to patient; she will review this at her convenience.      A total of 1 min was spent on advance care planning, goals of care discussion, emotional support, formulating and communicating prognosis and exploring burden/benefit of various approaches of treatment. This discussion occurred on a fully voluntary basis with the verbal consent of the patient and/or family.           Provided patient with a 5-10 year written screening schedule and personal prevention plan. Recommendations were developed using the USPSTF age appropriate recommendations. Education, counseling, and referrals were provided as needed. After Visit Summary printed and given to patient, which includes a list of additional screenings\tests needed.    Follow up in about 3 months (around 9/30/2025) for Chronic Medical Management. In addition to their scheduled follow up, the patient has also been instructed to follow up on as needed basis.     Future Appointments   Date Time Provider Department Center   12/1/2025  2:15 PM INJECTION CHAIR 01, [facility] CHEMOTHERAPY INFUSION [facility] CHEMO [facility]        NADIYA VALDEZ DO

## 2025-07-13 ENCOUNTER — OFFICE VISIT (OUTPATIENT)
Dept: URGENT CARE | Facility: CLINIC | Age: 65
End: 2025-07-13
Payer: MEDICARE

## 2025-07-13 VITALS
RESPIRATION RATE: 16 BRPM | DIASTOLIC BLOOD PRESSURE: 80 MMHG | OXYGEN SATURATION: 100 % | HEART RATE: 60 BPM | HEIGHT: 66 IN | TEMPERATURE: 98 F | SYSTOLIC BLOOD PRESSURE: 136 MMHG | WEIGHT: 145 LBS | BODY MASS INDEX: 23.3 KG/M2

## 2025-07-13 DIAGNOSIS — B97.89 ACUTE VIRAL SINUSITIS: Primary | ICD-10-CM

## 2025-07-13 DIAGNOSIS — R05.1 ACUTE COUGH: ICD-10-CM

## 2025-07-13 DIAGNOSIS — J02.9 SORE THROAT: ICD-10-CM

## 2025-07-13 DIAGNOSIS — J01.90 ACUTE VIRAL SINUSITIS: Primary | ICD-10-CM

## 2025-07-13 PROCEDURE — 99213 OFFICE O/P EST LOW 20 MIN: CPT | Mod: ,,, | Performed by: FAMILY MEDICINE

## 2025-07-13 RX ORDER — ALBUTEROL SULFATE 90 UG/1
2 INHALANT RESPIRATORY (INHALATION) EVERY 6 HOURS PRN
Qty: 18 G | Refills: 0 | Status: SHIPPED | OUTPATIENT
Start: 2025-07-13

## 2025-07-13 RX ORDER — PREDNISONE 20 MG/1
20 TABLET ORAL 2 TIMES DAILY
Qty: 10 TABLET | Refills: 0 | Status: SHIPPED | OUTPATIENT
Start: 2025-07-13 | End: 2025-07-18

## 2025-07-13 NOTE — PATIENT INSTRUCTIONS
Assessment/Plan:   Acute viral sinusitis  -     predniSONE (DELTASONE) 20 MG tablet; Take 1 tablet (20 mg total) by mouth 2 (two) times daily. for 5 days  Dispense: 10 tablet; Refill: 0  Likely rhino virus versus other.  Treatment as above and below.  Benefit from high dose of vitamin-C supplementation.  Recommend rest of the next 24-48 hours.  Drink plenty of water as to not become dehydrated.  See additional recommendations below in patient instructions section.  Sore throat    Acute cough  -     albuterol (PROVENTIL HFA) 90 mcg/actuation inhaler; Inhale 2 puffs into the lungs every 6 (six) hours as needed for Wheezing or Shortness of Breath. Rescue  Dispense: 18 g; Refill: 0       No orders of the defined types were placed in this encounter.      Education and counseling done face to face regarding medical conditions and plan. Contact office if new symptoms develop. Should any symptoms ever significantly worsen seek emergency medical attention/go to ER. Follow up at least yearly for wellness or sooner PRN. Nurse to call patient with any results. The patient is receptive, expresses understanding and is agreeable to plan. All questions have been answered.          If your condition worsens or fails to improve we recommend that you receive another evaluation at the ER immediately or contact your PCP to discuss your concerns or return here. You must understand that you've received an urgent care treatment only and that you may be released before all your medical problems are known or treated. You the patient will arrange for followup care as instructed.   If we discussed that I think your illness is viral it will not respond to antibiotics and it will last 10-14 days.     Flonase (fluticasone) is a nasal spray which is available over the counter and may help with your symptoms, Asterpro (Azelastine) is a nasal antihistamine that can also be taken    Zyrtec D, Claritin D or allegra D can also help with symptoms of  "congestion and drainage.   If you have hypertension avoid using the "D" which is the decongestant   If you just have drainage you can take plain zyrtec, claritin or allegra   If you just have a congested feeling you can take pseudoephedrine (unless you have high blood pressure) which you have to sign for behind the counter. Do not buy the phenylephrine which is on the shelf as it is not effective   Rest and fluids are also important.   Tylenol or ibuprofen can also be used as directed for pain unless you have an allergy to them or medical condition such as stomach ulcers, kidney or liver disease or blood thinners etc for which you should not be taking these type of medications.   If you are flying in the next few days Afrin nose drops for the airplane flight upon take off and landing may help. Other than at those times refrain from using afrin.   If you were prescribed a narcotic do not drive or operate heavy machinery while taking these medications.    "

## 2025-07-13 NOTE — PROGRESS NOTES
"Patient ID: Kat Frazier is a 64 y.o. female.  Chief Complaint: Sore Throat    HPI:   Patient presents here today for above reason.     Patient is a 64 y.o. female who presents to urgent care with complaints of sore throat, congestion, cough at times, sinus pressure x Friday. Alleviating factors include Claritin, Mucinex with moderate amount of relief. Patient denies any confirmed fever or any other symptoms at this time. Patient additionally c/o bruising on bilat forearms that she noticed after a massage on this past Wednesday.     Past Medical History:  Past Medical History:   Diagnosis Date    Anxiety     Generalized anxiety disorder 08/18/2022    Hypertension     Irritable bowel syndrome with constipation 06/26/2024    OP (osteoporosis) 10/21/2022    Primary osteoarthritis involving multiple joints 01/03/2024    Vitamin D deficiency 03/22/2024     Past Surgical History:   Procedure Laterality Date    AUGMENTATION OF BREAST      BACK SURGERY  04/28/2023     Review of patient's allergies indicates:   Allergen Reactions    Sulfa (sulfonamide antibiotics) Itching     Current Outpatient Medications   Medication Instructions    albuterol (PROVENTIL HFA) 90 mcg/actuation inhaler 2 puffs, Inhalation, Every 6 hours PRN, Rescue    azelastine (ASTELIN) 137 mcg, Nasal, 2 times daily    baclofen (LIORESAL) 10-20 mg, Oral, Nightly    denosumab (PROLIA SUBQ) Every 6 months    diazePAM (VALIUM) 5 mg, Oral, Daily PRN    fluticasone propionate (FLONASE) 50 mcg, Each Nostril, Daily    norethindrone-ethinyl estradiol (FEMHRT 1/5) 1-5 mg-mcg Tab 1 tablet, Daily    predniSONE (DELTASONE) 20 mg, Oral, 2 times daily     Social History[1]    ROS:   Review of Systems  12 point review of systems conducted, negative except as stated in the history of present illness. See HPI for details.   Vitals/PE:   Visit Vitals  /80   Pulse 60   Temp 98.2 °F (36.8 °C)   Resp 16   Ht 5' 6" (1.676 m)   Wt 65.8 kg (145 lb)   LMP  (LMP " Unknown)   SpO2 100%   BMI 23.40 kg/m²     Physical Exam  Vitals and nursing note reviewed.   Constitutional:       Appearance: She is ill-appearing. She is not toxic-appearing or diaphoretic.   HENT:      Right Ear: Tympanic membrane normal.      Left Ear: Tympanic membrane normal.      Nose: Mucosal edema and congestion present.      Right Turbinates: Enlarged and swollen.      Left Turbinates: Enlarged and swollen.      Right Sinus: Maxillary sinus tenderness and frontal sinus tenderness present.      Left Sinus: Maxillary sinus tenderness and frontal sinus tenderness present.      Mouth/Throat:      Pharynx: Posterior oropharyngeal erythema present.   Eyes:      Pupils: Pupils are equal, round, and reactive to light.   Cardiovascular:      Rate and Rhythm: Normal rate.      Pulses: Normal pulses.   Pulmonary:      Effort: Pulmonary effort is normal.      Breath sounds: Wheezing present.   Skin:     General: Skin is warm.      Capillary Refill: Capillary refill takes less than 2 seconds.   Neurological:      General: No focal deficit present.      Mental Status: She is alert and oriented to person, place, and time.   Psychiatric:         Mood and Affect: Mood normal.         Assessment/Plan:   Acute viral sinusitis  -     predniSONE (DELTASONE) 20 MG tablet; Take 1 tablet (20 mg total) by mouth 2 (two) times daily. for 5 days  Dispense: 10 tablet; Refill: 0  Likely rhino virus versus other.  Treatment as above and below.  Benefit from high dose of vitamin-C supplementation.  Recommend rest of the next 24-48 hours.  Drink plenty of water as to not become dehydrated.  See additional recommendations below in patient instructions section.  Sore throat    Acute cough  -     albuterol (PROVENTIL HFA) 90 mcg/actuation inhaler; Inhale 2 puffs into the lungs every 6 (six) hours as needed for Wheezing or Shortness of Breath. Rescue  Dispense: 18 g; Refill: 0       No orders of the defined types were placed in this  encounter.      Education and counseling done face to face regarding medical conditions and plan. Contact office if new symptoms develop. Should any symptoms ever significantly worsen seek emergency medical attention/go to ER. Follow up at least yearly for wellness or sooner PRN. Nurse to call patient with any results. The patient is receptive, expresses understanding and is agreeable to plan. All questions have been answered.  No follow-ups on file.           [1]   Social History  Socioeconomic History    Marital status:     Number of children: 5   Occupational History    Occupation: Unemployed    Tobacco Use    Smoking status: Never    Smokeless tobacco: Never   Substance and Sexual Activity    Alcohol use: Yes     Comment: occasional    Drug use: Never    Sexual activity: Not Currently

## 2025-07-22 ENCOUNTER — HOSPITAL ENCOUNTER (OUTPATIENT)
Dept: RADIOLOGY | Facility: HOSPITAL | Age: 65
Discharge: HOME | End: 2025-07-22
Attending: ORTHOPAEDIC SURGERY
Payer: MEDICARE

## 2025-07-22 DIAGNOSIS — M54.16 LUMBAR RADICULOPATHY: ICD-10-CM

## 2025-07-22 PROCEDURE — 72100 X-RAY EXAM L-S SPINE 2/3 VWS: CPT | Mod: TC

## 2025-08-23 ENCOUNTER — OFFICE VISIT (OUTPATIENT)
Dept: URGENT CARE | Facility: CLINIC | Age: 65
End: 2025-08-23
Payer: MEDICARE

## 2025-08-23 VITALS
DIASTOLIC BLOOD PRESSURE: 88 MMHG | OXYGEN SATURATION: 98 % | SYSTOLIC BLOOD PRESSURE: 152 MMHG | RESPIRATION RATE: 20 BRPM | TEMPERATURE: 98 F | HEART RATE: 65 BPM

## 2025-08-23 DIAGNOSIS — J34.9 SINUS PROBLEM: Primary | ICD-10-CM

## 2025-08-23 DIAGNOSIS — U07.1 COVID-19 VIRUS DETECTED: ICD-10-CM

## 2025-08-23 DIAGNOSIS — U07.1 COVID-19: ICD-10-CM

## 2025-08-23 LAB
CTP QC/QA: YES
SARS-COV+SARS-COV-2 AG RESP QL IA.RAPID: POSITIVE

## 2025-08-23 PROCEDURE — 96372 THER/PROPH/DIAG INJ SC/IM: CPT | Mod: ,,, | Performed by: NURSE PRACTITIONER

## 2025-08-23 PROCEDURE — 87811 SARS-COV-2 COVID19 W/OPTIC: CPT | Mod: QW,,, | Performed by: NURSE PRACTITIONER

## 2025-08-23 PROCEDURE — 99214 OFFICE O/P EST MOD 30 MIN: CPT | Mod: 25,,, | Performed by: NURSE PRACTITIONER

## 2025-08-23 RX ORDER — BETAMETHASONE SODIUM PHOSPHATE AND BETAMETHASONE ACETATE 3; 3 MG/ML; MG/ML
9 INJECTION, SUSPENSION INTRA-ARTICULAR; INTRALESIONAL; INTRAMUSCULAR; SOFT TISSUE
Status: COMPLETED | OUTPATIENT
Start: 2025-08-23 | End: 2025-08-23

## 2025-08-23 RX ORDER — PREDNISONE 20 MG/1
20 TABLET ORAL 2 TIMES DAILY
Qty: 6 TABLET | Refills: 0 | Status: SHIPPED | OUTPATIENT
Start: 2025-08-24 | End: 2025-08-27

## 2025-08-23 RX ADMIN — BETAMETHASONE SODIUM PHOSPHATE AND BETAMETHASONE ACETATE 9 MG: 3; 3 INJECTION, SUSPENSION INTRA-ARTICULAR; INTRALESIONAL; INTRAMUSCULAR; SOFT TISSUE at 01:08
